# Patient Record
Sex: FEMALE | Race: WHITE | NOT HISPANIC OR LATINO | Employment: OTHER
[De-identification: names, ages, dates, MRNs, and addresses within clinical notes are randomized per-mention and may not be internally consistent; named-entity substitution may affect disease eponyms.]

---

## 2017-10-01 ENCOUNTER — HEALTH MAINTENANCE LETTER (OUTPATIENT)
Age: 62
End: 2017-10-01

## 2021-05-28 ENCOUNTER — RECORDS - HEALTHEAST (OUTPATIENT)
Dept: ADMINISTRATIVE | Facility: CLINIC | Age: 66
End: 2021-05-28

## 2024-03-06 ENCOUNTER — DOCUMENTATION ONLY (OUTPATIENT)
Dept: GERIATRICS | Facility: CLINIC | Age: 69
End: 2024-03-06
Payer: COMMERCIAL

## 2024-03-06 PROBLEM — J96.90 RESPIRATORY FAILURE (H): Status: ACTIVE | Noted: 2023-10-18

## 2024-03-06 PROBLEM — G47.9 SLEEP DISORDER: Status: ACTIVE | Noted: 2019-05-01

## 2024-03-06 PROBLEM — S21.101A STERNAL WOUND INFECTION: Status: ACTIVE | Noted: 2023-09-29

## 2024-03-06 PROBLEM — L08.9 STERNAL WOUND INFECTION: Status: ACTIVE | Noted: 2023-09-29

## 2024-03-06 PROBLEM — J18.9 PNEUMONIA: Status: ACTIVE | Noted: 2022-12-19

## 2024-03-06 PROBLEM — Z90.3 HISTORY OF SLEEVE GASTRECTOMY: Status: ACTIVE | Noted: 2022-05-19

## 2024-03-06 PROBLEM — M54.50 CHRONIC RIGHT-SIDED LOW BACK PAIN: Status: ACTIVE | Noted: 2020-09-18

## 2024-03-06 PROBLEM — S06.0X9A CONCUSSION WITH LOSS OF CONSCIOUSNESS: Status: ACTIVE | Noted: 2023-01-26

## 2024-03-06 PROBLEM — R10.9 FLANK PAIN: Status: ACTIVE | Noted: 2022-11-13

## 2024-03-06 PROBLEM — H25.10 NUCLEAR SENILE CATARACT: Status: ACTIVE | Noted: 2024-03-06

## 2024-03-06 PROBLEM — T82.6XXA PROSTHETIC VALVE ENDOCARDITIS (H): Status: ACTIVE | Noted: 2024-02-14

## 2024-03-06 PROBLEM — R65.20 SEVERE SEPSIS (H): Status: ACTIVE | Noted: 2024-02-04

## 2024-03-06 PROBLEM — F11.20 OPIOID TYPE DEPENDENCE, CONTINUOUS USE (H): Status: ACTIVE | Noted: 2021-10-15

## 2024-03-06 PROBLEM — J96.21 ACUTE ON CHRONIC RESPIRATORY FAILURE WITH HYPOXIA AND HYPERCAPNIA (H): Status: ACTIVE | Noted: 2023-09-13

## 2024-03-06 PROBLEM — I33.0 ACUTE BACTERIAL ENDOCARDITIS: Status: ACTIVE | Noted: 2024-02-18

## 2024-03-06 PROBLEM — R78.81 ENTEROCOCCAL BACTEREMIA: Status: ACTIVE | Noted: 2024-02-06

## 2024-03-06 PROBLEM — M85.89 OSTEOPENIA OF MULTIPLE SITES: Status: ACTIVE | Noted: 2023-12-15

## 2024-03-06 PROBLEM — J96.11 CHRONIC RESPIRATORY FAILURE WITH HYPOXIA (H): Status: ACTIVE | Noted: 2021-12-31

## 2024-03-06 PROBLEM — K59.09 CHRONIC CONSTIPATION: Status: ACTIVE | Noted: 2020-10-28

## 2024-03-06 PROBLEM — R76.8 POSITIVE ANA (ANTINUCLEAR ANTIBODY): Status: ACTIVE | Noted: 2022-04-05

## 2024-03-06 PROBLEM — J44.1 ACUTE EXACERBATION OF CHRONIC OBSTRUCTIVE PULMONARY DISEASE (COPD) (H): Status: ACTIVE | Noted: 2022-10-27

## 2024-03-06 PROBLEM — F07.81 POST CONCUSSION SYNDROME: Status: ACTIVE | Noted: 2019-04-03

## 2024-03-06 PROBLEM — I38 PROSTHETIC VALVE ENDOCARDITIS (H): Status: ACTIVE | Noted: 2024-02-14

## 2024-03-06 PROBLEM — F33.9 DEPRESSION, RECURRENT (H): Status: ACTIVE | Noted: 2022-02-01

## 2024-03-06 PROBLEM — B95.2 ENTEROCOCCAL BACTEREMIA: Status: ACTIVE | Noted: 2024-02-06

## 2024-03-06 PROBLEM — T81.328A STERNAL WOUND DEHISCENCE: Status: ACTIVE | Noted: 2023-09-29

## 2024-03-06 PROBLEM — R41.89 COGNITIVE IMPAIRMENT: Status: ACTIVE | Noted: 2019-05-01

## 2024-03-06 PROBLEM — I35.0 AORTIC STENOSIS: Status: ACTIVE | Noted: 2022-12-19

## 2024-03-06 PROBLEM — M25.511 CHRONIC RIGHT SHOULDER PAIN: Status: ACTIVE | Noted: 2020-09-18

## 2024-03-06 PROBLEM — R31.9 HEMATURIA: Status: ACTIVE | Noted: 2022-11-13

## 2024-03-06 PROBLEM — J98.11 COMPLETE ATELECTASIS OF LEFT LUNG: Status: ACTIVE | Noted: 2023-11-14

## 2024-03-06 PROBLEM — G89.29 CHRONIC RIGHT-SIDED LOW BACK PAIN: Status: ACTIVE | Noted: 2020-09-18

## 2024-03-06 PROBLEM — R42 VERTIGO: Status: ACTIVE | Noted: 2023-12-01

## 2024-03-06 PROBLEM — E66.01 OBESITY, CLASS III, BMI 40-49.9 (MORBID OBESITY) (H): Status: ACTIVE | Noted: 2022-11-13

## 2024-03-06 PROBLEM — G89.29 CHRONIC RIGHT SHOULDER PAIN: Status: ACTIVE | Noted: 2020-09-18

## 2024-03-06 PROBLEM — J44.9 COPD, MODERATE (H): Status: ACTIVE | Noted: 2019-10-21

## 2024-03-06 PROBLEM — M54.6 CHRONIC RIGHT-SIDED THORACIC BACK PAIN: Status: ACTIVE | Noted: 2022-11-22

## 2024-03-06 PROBLEM — L85.3 XEROSIS CUTIS: Status: ACTIVE | Noted: 2020-10-28

## 2024-03-06 PROBLEM — Z95.2 HISTORY OF AORTIC VALVE REPLACEMENT: Status: ACTIVE | Noted: 2023-09-05

## 2024-03-06 PROBLEM — L73.2 HIDRADENITIS: Status: ACTIVE | Noted: 2021-11-08

## 2024-03-06 PROBLEM — J96.22 ACUTE ON CHRONIC RESPIRATORY FAILURE WITH HYPOXIA AND HYPERCAPNIA (H): Status: ACTIVE | Noted: 2023-09-13

## 2024-03-06 PROBLEM — U07.1 COVID-19 VIRUS INFECTION: Status: ACTIVE | Noted: 2022-02-01

## 2024-03-06 PROBLEM — I33.0 ABSCESS OF AORTIC ROOT: Status: ACTIVE | Noted: 2024-02-14

## 2024-03-06 PROBLEM — A41.9 SEVERE SEPSIS (H): Status: ACTIVE | Noted: 2024-02-04

## 2024-03-06 PROBLEM — G56.21 ULNAR NEUROPATHY AT ELBOW, RIGHT: Status: ACTIVE | Noted: 2022-06-13

## 2024-03-06 PROBLEM — S06.0X1A CONCUSSION WITH BRIEF LOC: Status: ACTIVE | Noted: 2019-04-03

## 2024-03-06 PROBLEM — N12 PYELONEPHRITIS: Status: ACTIVE | Noted: 2022-11-15

## 2024-03-06 PROBLEM — R11.0 NAUSEA: Status: ACTIVE | Noted: 2021-08-17

## 2024-03-06 PROBLEM — K21.9 LARYNGOPHARYNGEAL REFLUX (LPR): Status: ACTIVE | Noted: 2021-08-17

## 2024-03-06 PROBLEM — N83.8 OVARIAN MASS: Status: ACTIVE | Noted: 2022-11-14

## 2024-03-06 PROBLEM — S81.801D LEG WOUND, RIGHT, SUBSEQUENT ENCOUNTER: Status: ACTIVE | Noted: 2023-12-01

## 2024-03-06 PROBLEM — Z79.52 CURRENT CHRONIC USE OF SYSTEMIC STEROIDS: Status: ACTIVE | Noted: 2023-12-15

## 2024-03-06 PROBLEM — G89.29 CHRONIC RIGHT-SIDED THORACIC BACK PAIN: Status: ACTIVE | Noted: 2022-11-22

## 2024-03-08 ENCOUNTER — TELEPHONE (OUTPATIENT)
Dept: GERIATRICS | Facility: CLINIC | Age: 69
End: 2024-03-08
Payer: COMMERCIAL

## 2024-03-08 ENCOUNTER — DOCUMENTATION ONLY (OUTPATIENT)
Dept: GERIATRICS | Facility: CLINIC | Age: 69
End: 2024-03-08
Payer: COMMERCIAL

## 2024-03-08 DIAGNOSIS — G89.29 CHRONIC RIGHT SHOULDER PAIN: Primary | ICD-10-CM

## 2024-03-08 DIAGNOSIS — M25.511 CHRONIC RIGHT SHOULDER PAIN: Primary | ICD-10-CM

## 2024-03-08 RX ORDER — GUAIFENESIN 600 MG/1
1200 TABLET, EXTENDED RELEASE ORAL 2 TIMES DAILY
COMMUNITY
Start: 2024-03-11 | End: 2024-04-04

## 2024-03-08 RX ORDER — OXYCODONE HYDROCHLORIDE 5 MG/1
5-10 TABLET ORAL EVERY 6 HOURS PRN
Qty: 30 TABLET | Refills: 0 | Status: SHIPPED | OUTPATIENT
Start: 2024-03-08 | End: 2024-03-21

## 2024-03-08 RX ORDER — ALBUTEROL SULFATE 0.83 MG/ML
2.5 SOLUTION RESPIRATORY (INHALATION) 4 TIMES DAILY PRN
COMMUNITY
Start: 2024-03-11

## 2024-03-08 RX ORDER — OXYCODONE HYDROCHLORIDE 5 MG/1
5-10 TABLET ORAL EVERY 6 HOURS PRN
COMMUNITY
End: 2024-03-08

## 2024-03-08 RX ORDER — OXYCODONE HYDROCHLORIDE 5 MG/1
5-10 TABLET ORAL EVERY 6 HOURS PRN
Qty: 30 TABLET | Refills: 0 | Status: CANCELLED | OUTPATIENT
Start: 2024-03-08

## 2024-03-08 RX ORDER — IPRATROPIUM BROMIDE AND ALBUTEROL SULFATE 2.5; .5 MG/3ML; MG/3ML
1 SOLUTION RESPIRATORY (INHALATION) 4 TIMES DAILY
COMMUNITY
Start: 2024-03-11

## 2024-03-08 NOTE — TELEPHONE ENCOUNTER
[facility] Triage Nurse Telephone Encounter    Provider: TAMMIE Rahman CNP  Facility: [facility]  Facility Type:  TCU    Caller: Chayo  Call Back Number:     Allergies:    Allergies   Allergen Reactions    Cephalexin Shortness Of Breath and Rash     And chest pain    Gabapentin Dizziness     Increased sedation even at 100 mg TID    Penicillin G Hives and Rash    Penicillins Hives and Rash        Reason for call: Patient admitted to facility yesterday.  Patient has some medication changes she wants done to reflect how she takes the medications at home.  Patient wants Marinol discontinue cause she is not having any issues with her appetite, Systane switched to PRN, Nebulizers switched so DuoNebs are scheduled and Albuterol is PRN, Mucinex scheduled BID, and Oxycodone switched from 10 mg to 5-10 mg PRN.      Verbal Order/Direction given by Provider: Discontinue Marinol, Change systane eye drop to PRN, schedule Duonebs QID and change Albuterol nebs to PRN, schedule Mucinex BID, and change Oxycodone to 5-10 mg every 6 hours PRN using pain scale.       Provider giving Order:  TAMMIE Rahman CNP    Verbal Order given to: Chayo Garcia RN

## 2024-03-10 ENCOUNTER — LAB REQUISITION (OUTPATIENT)
Dept: LAB | Facility: CLINIC | Age: 69
End: 2024-03-10

## 2024-03-10 DIAGNOSIS — R78.81 BACTEREMIA: ICD-10-CM

## 2024-03-10 NOTE — PROGRESS NOTES
[facility] GERIATRICS  INITIAL VISIT NOTE      PRIMARY CARE PROVIDER AND CLINIC: Bert Mccoy [address] [facility] [address]    [facility] Medical Record Number: [id number]  Place of Service where encounter took place: [facility] [place] ([facility]) [[id number]]    Chief Complaint   Patient presents with    Hospital F/U     [facility] 2/14/2024 - 3/7/2024     HPI:    Elly Corrales is a 68 year old (1955) female was admitted to the above facility from [facility] . Hospital stay 2/14/24 through 3/7/24 where they were admitted for endocarditis. Now admitted to this facility for rehab, medical management, and nursing care.      History obtained from: facility chart records, facility staff, patient report, and [facility] chart review.      Brief Hospital Course: PMH of severe aortic stenosis S/P aortic valve replacement, mild coronary artery disease, HTN, chronic pain with opioid dependence, TRE, obesity S/P gastric sleeve, and hypothyroidism with recent hospitalization for PEG tube site infection; developed cellulitis and sepsis. Blood cultures grew enterococcus. 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 Was transferred to [facility], [place]. ID continued vancomycin, started ceftriaxone and flagyl as had diverticulosis noted on CT. Underwent aortic root replacement Bentall procedure and homograph on 2/22. Did have significant bleeding, hypoxia requiring high vent settings and acute renal failure requiring Lasix gtt. Renal function improved, weaned off vent but continue to require supplemental oxygen. Had CT that showed LLL atelectasis, progressed septic emboli in right lung. Had episode of NSVT on 3/7, Echo showed pericardial effusion. Was started on colchicine x 3 months. ID recommended IV ceftriaxone and vancomycin x 28 days. Was started on dronabinol for poor appetite. When medically stable was  discharged to TCU for further rehab and medical management.      TCU Course: Seen today shortly after lunch. She had just been outside smoke a cigarette, says she only smokes about 2 a day, plans to not smoke at all when she goes home, requests that her children not be told that she has been smoking. She denies any pain currently. She had both PT and OT  this morning. Feels that they went well. Was able to walk ~10ft, says she is not able to walk very far at home because she gets SOB. Denies any SOB at rest, rare cough. No chest pain. Has been following her sternal precautions. Is having bowel movements. Has to eat small meals due to her gastric sleeve. She is hoping she will be able to go home in 10 days because she does not want her waiver to close, wants to due her antibiotics at home.     CODE STATUS/ADVANCE DIRECTIVES: CPR/Full code     ALLERGIES:  Allergies   Allergen Reactions    Cephalexin Shortness Of Breath and Rash     And chest pain    Gabapentin Dizziness     Increased sedation even at 100 mg TID    Penicillin G Hives and Rash    Penicillins Hives and Rash     PAST MEDICAL HISTORY:   No past medical history on file.  PAST SURGICAL HISTORY:   Past Surgical History:   Procedure Laterality Date    PARTIAL HYSTERECTOMY       FAMILY HISTORY:   No family history on file.      SOCIAL HISTORY:   Patient's living condition: lives with spouse    MEDICATIONS  Post Discharge Medication Reconciliation Status: discharge medications reconciled and changed, per note/orders.  Current Outpatient Medications   Medication Sig Dispense Refill    acetaminophen (TYLENOL) 500 MG tablet Take 1,000 mg by mouth 4 times daily      albuterol (PROAIR HFA/PROVENTIL HFA/VENTOLIN HFA) 108 (90 Base) MCG/ACT inhaler Inhale 2 puffs into the lungs every 4 hours as needed for shortness of breath, wheezing or cough      albuterol (PROVENTIL) (2.5 MG/3ML) 0.083% neb solution Take 2.5 mg by nebulization 4 times daily as needed for shortness of  breath, wheezing or cough      alteplase (CATHFLO ACTIVASE) 2 MG injection Inject 2 mg into the vein as needed      aspirin (ASA) 81 MG chewable tablet Take 81 mg by mouth daily      atorvastatin (LIPITOR) 40 MG tablet Take 40 mg by mouth daily      Calcium Carbonate Antacid 1177 MG CHEW Take 1 tablet by mouth 3 times daily      carvedilol (COREG) 25 MG tablet Take 25 mg by mouth 2 times daily (with meals)      cefTRIAXone (ROCEPHIN) 2 GM vial Inject 2 g into the vein every 12 hours For 28 days then discontinue      colchicine (COLCRYS) 0.6 MG tablet Take 0.6 mg by mouth 2 times daily      cyanocobalamin (VITAMIN B-12) 1000 MCG sublingual tablet Place 1 tablet under the tongue daily      DULoxetine (CYMBALTA) 60 MG capsule Take 60 mg by mouth daily      fluticasone (FLONASE) 50 MCG/ACT nasal spray Spray 1 spray into both nostrils daily as needed for rhinitis or allergies      Fluticasone-Umeclidin-Vilanterol (TRELEGY ELLIPTA) 200-62.5-25 MCG/ACT oral inhaler Inhale 1 puff into the lungs daily      furosemide (LASIX) 40 MG tablet Take 40 mg by mouth daily      guaiFENesin (MUCINEX) 600 MG 12 hr tablet Take 1,200 mg by mouth 2 times daily      hydrOXYzine deanna (VISTARIL) 25 MG capsule Take 25 mg by mouth 3 times daily as needed for itching      ipratropium - albuterol 0.5 mg/2.5 mg/3 mL (DUONEB) 0.5-2.5 (3) MG/3ML neb solution Take 1 vial by nebulization 4 times daily      levothyroxine (SYNTHROID/LEVOTHROID) 150 MCG tablet Take 150 mcg by mouth daily      lidocaine (LIDODERM) 5 % patch Place onto the skin every 24 hours To prevent lidocaine toxicity, patient should be patch free for 12 hrs daily.      loratadine (CLARITIN) 10 MG tablet Take 10 mg by mouth daily      losartan (COZAAR) 50 MG tablet Take 50 mg by mouth daily      magnesium oxide 400 MG tablet Take 400 mg by mouth daily      meclizine (ANTIVERT) 12.5 MG tablet Take 12.5 mg by mouth 3 times daily as needed for dizziness      melatonin 3 MG tablet Take 3 mg  "by mouth nightly as needed for sleep      multivitamin childrens (ANIMAL SHAPES) CHEW chewable tablet Take 2 tablets by mouth daily      ondansetron (ZOFRAN ODT) 8 MG ODT tab Take 8 mg by mouth every 8 hours as needed for nausea      oxyCODONE (ROXICODONE) 5 MG tablet Take 1-2 tablets (5-10 mg) by mouth every 6 hours as needed for severe pain 30 tablet 0    pantoprazole (PROTONIX) 40 MG EC tablet Take 40 mg by mouth 2 times daily      polyethylene glycol 3350 POWD Take 17 g by mouth daily as needed (constipation)      potassium chloride jaqui ER (KLOR-CON M20) 20 MEQ CR tablet Take 20 mEq by mouth daily      predniSONE (DELTASONE) 5 MG tablet Take 5 mg by mouth daily      propylene glycol (SYSTANE BALANCE) 0.6 % SOLN ophthalmic solution Place 1 drop into both eyes 4 times daily as needed for dry eyes      senna-docusate (SENOKOT-S/PERICOLACE) 8.6-50 MG tablet Take 2 tablets by mouth daily      simethicone (MYLICON) 125 MG chewable tablet Take 125 mg by mouth every 6 hours as needed for intestinal gas      Vancomycin HCl 1250 MG/250ML SOLN Inject 1,250 mg into the vein daily For 27 days then discontinue      Vitamin D3 (CHOLECALCIFEROL) 25 mcg (1000 units) tablet Take 1 tablet by mouth daily       ROS:  10 point ROS neg other than the symptoms noted above in the HPI.      PHYSICAL EXAM:  BP (!) 147/74   Pulse 87   Temp 98.1  F (36.7  C)   Resp 20   Ht 1.702 m (5' 7\")   Wt 93.9 kg (207 lb)   SpO2 96%   BMI 32.42 kg/m    Physical Exam  Constitutional:       Appearance: She is obese.   Cardiovascular:      Rate and Rhythm: Normal rate and regular rhythm.      Comments: + click  Pulmonary:      Effort: Pulmonary effort is normal.      Breath sounds: Normal breath sounds.      Comments: On 2L   Abdominal:      General: Bowel sounds are normal.      Palpations: Abdomen is soft.   Musculoskeletal:      Right lower leg: No edema.      Left lower leg: Edema present.   Neurological:      General: No focal deficit " present.      Mental Status: She is alert.   Psychiatric:         Mood and Affect: Mood normal.         Thought Content: Thought content normal.          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 Appears to be healing well. Denies any chest pain.  Discussed with patient, nursing staff, therapy.   - IV Vanco and Rocephin x 28 days, IV pharmacy to dose vanco  - CBC, Creatinine 2x/week while on IV abx, fax lab results to ID.   - sternal precautions  - analgesia with APAP 1000mg QID, oxycodoone 5-10mg PRN  - PT/OT  - follow-up with CV surgery    Pericardial effusion  Noted on Echo/CT on 3/4  - continue colchicine x 3 months    Coronary artery disease involving native coronary artery of native heart without angina pectoris  Primary hypertension  Hyperlipidemia, unspecified hyperlipidemia type  CAD mild per history. No recent chest pain. -160 since TCU admission,  Discussed with patient, nursing staff.   - daily weights  - continue Coreg, atorvastatin, losartan, ASA, furosemide  - follow-up with cardiology  - monitor and adjust     COPD, moderate (H)  Current chronic use of systemic steroids  Chronic respiratory failure   Physical deconditioning  Appears compensated. Discussed with patient - reports she uses 2-3L at baseline at home, 2 when at rest, 3L with activity. Per therapy SOB does limit activity tolerance.  Discussed with patient, nursing staff, therapy  - continue albuterol nebs/MDI PRN  - Trelegy inhaler daily  - oxygen 2-3L continuous    ABLA (acute blood loss anemia)  Hgb running 8-9,   - hgb to monitor for stability     Chronic right shoulder pain  - continue analgesia as above  - PT/OT    Gastroesophageal reflux disease without esophagitis  -  continue pantoprazole    gastrocutaneous fistula  PEG tube removed; Likely source of endocarditis. Continues to have drainage at site.  Discussed with patient, nursing staff.   - continue current wound cares   - follow-up with general surgery    History of sleeve gastrectomy  - eats enrique meal    Depression, recurrent (H24)  In good spirits on visit today    - continue Cymbalta,     Chronic constipation  Requesting senna be increased to BID  - senna-s 2 tab BID  - miralax daily PRN     Hypothyroidism, unspecified type  TSH 1.16  - continue levothyroxine 150mcg daily    Smoker  Currently smokers 2+ cigarettes a day, plans to stop when she goes home. Declined cum, nicotine patch  - supportive cares.       Orders:   Change meclizine to 12.5mg q AM and 12.5mg BID PRN  Oxygen 2-3L continuous  Ok to leave sternal incision SARAHI  Increase senna-s to 2 tab BID  Change albuterol MDI to 2 puff q4h PRN    Total time spent with patient visit at the skilled nursing facility was 45 min including patient visit and review of past records. Total time spent reviewing records from hospitalization outside my organization, review of TCU facility records, medication reconciliation, discussion of plan of care with patient, nursing staff and therapy as stated above as well as time spent on documentation.      Electronically signed by:  TAMMIE Cruz CNP

## 2024-03-11 ENCOUNTER — TRANSITIONAL CARE UNIT VISIT (OUTPATIENT)
Dept: GERIATRICS | Facility: CLINIC | Age: 69
End: 2024-03-11
Payer: COMMERCIAL

## 2024-03-11 VITALS
DIASTOLIC BLOOD PRESSURE: 74 MMHG | OXYGEN SATURATION: 96 % | BODY MASS INDEX: 32.49 KG/M2 | WEIGHT: 207 LBS | RESPIRATION RATE: 20 BRPM | TEMPERATURE: 98.1 F | HEART RATE: 87 BPM | HEIGHT: 67 IN | SYSTOLIC BLOOD PRESSURE: 147 MMHG

## 2024-03-11 DIAGNOSIS — Z90.3 HISTORY OF SLEEVE GASTRECTOMY: ICD-10-CM

## 2024-03-11 DIAGNOSIS — G89.29 CHRONIC RIGHT SHOULDER PAIN: ICD-10-CM

## 2024-03-11 DIAGNOSIS — K31.6 GASTROCUTANEOUS FISTULA DUE TO GASTROSTOMY TUBE: ICD-10-CM

## 2024-03-11 DIAGNOSIS — I31.39 PERICARDIAL EFFUSION: ICD-10-CM

## 2024-03-11 DIAGNOSIS — I10 PRIMARY HYPERTENSION: ICD-10-CM

## 2024-03-11 DIAGNOSIS — Z79.52 CURRENT CHRONIC USE OF SYSTEMIC STEROIDS: ICD-10-CM

## 2024-03-11 DIAGNOSIS — I33.0 ACUTE BACTERIAL ENDOCARDITIS: Primary | ICD-10-CM

## 2024-03-11 DIAGNOSIS — E78.5 HYPERLIPIDEMIA, UNSPECIFIED HYPERLIPIDEMIA TYPE: ICD-10-CM

## 2024-03-11 DIAGNOSIS — I38 PROSTHETIC VALVE ENDOCARDITIS, SUBSEQUENT ENCOUNTER: ICD-10-CM

## 2024-03-11 DIAGNOSIS — D62 ABLA (ACUTE BLOOD LOSS ANEMIA): ICD-10-CM

## 2024-03-11 DIAGNOSIS — F17.200 SMOKER: ICD-10-CM

## 2024-03-11 DIAGNOSIS — Z48.812 AFTERCARE FOLLOWING SURGERY OF THE CIRCULATORY SYSTEM, NEC: ICD-10-CM

## 2024-03-11 DIAGNOSIS — K59.09 CHRONIC CONSTIPATION: ICD-10-CM

## 2024-03-11 DIAGNOSIS — I33.0 ABSCESS OF AORTIC ROOT: ICD-10-CM

## 2024-03-11 DIAGNOSIS — E03.9 HYPOTHYROIDISM, UNSPECIFIED TYPE: ICD-10-CM

## 2024-03-11 DIAGNOSIS — I25.10 CORONARY ARTERY DISEASE INVOLVING NATIVE CORONARY ARTERY OF NATIVE HEART WITHOUT ANGINA PECTORIS: ICD-10-CM

## 2024-03-11 DIAGNOSIS — T82.6XXD PROSTHETIC VALVE ENDOCARDITIS, SUBSEQUENT ENCOUNTER: ICD-10-CM

## 2024-03-11 DIAGNOSIS — R53.81 PHYSICAL DECONDITIONING: ICD-10-CM

## 2024-03-11 DIAGNOSIS — K21.9 GASTROESOPHAGEAL REFLUX DISEASE WITHOUT ESOPHAGITIS: ICD-10-CM

## 2024-03-11 DIAGNOSIS — F33.9 DEPRESSION, RECURRENT (H): ICD-10-CM

## 2024-03-11 DIAGNOSIS — J44.9 COPD, MODERATE (H): ICD-10-CM

## 2024-03-11 DIAGNOSIS — M25.511 CHRONIC RIGHT SHOULDER PAIN: ICD-10-CM

## 2024-03-11 LAB
BASOPHILS # BLD MANUAL: 0.3 10E3/UL (ref 0–0.2)
BASOPHILS NFR BLD MANUAL: 3 %
CREAT SERPL-MCNC: 0.83 MG/DL (ref 0.51–0.95)
EGFRCR SERPLBLD CKD-EPI 2021: 77 ML/MIN/1.73M2
EOSINOPHIL # BLD MANUAL: 0.3 10E3/UL (ref 0–0.7)
EOSINOPHIL NFR BLD MANUAL: 3 %
LYMPHOCYTES # BLD MANUAL: 0.8 10E3/UL (ref 0.8–5.3)
LYMPHOCYTES NFR BLD MANUAL: 9 %
MONOCYTES # BLD MANUAL: 1.1 10E3/UL (ref 0–1.3)
MONOCYTES NFR BLD MANUAL: 12 %
NEUTROPHILS # BLD MANUAL: 6.7 10E3/UL (ref 1.6–8.3)
NEUTROPHILS NFR BLD MANUAL: 73 %
NRBC # BLD AUTO: 0 10E3/UL
NRBC BLD AUTO-RTO: 0 /100
PLAT MORPH BLD: ABNORMAL
RBC MORPH BLD: ABNORMAL
VANCOMYCIN SERPL-MCNC: 16.9 UG/ML
WBC # BLD AUTO: 9.2 10E3/UL (ref 4–11)

## 2024-03-11 PROCEDURE — 80202 ASSAY OF VANCOMYCIN: CPT | Performed by: NURSE PRACTITIONER

## 2024-03-11 PROCEDURE — P9603 ONE-WAY ALLOW PRORATED MILES: HCPCS | Performed by: NURSE PRACTITIONER

## 2024-03-11 PROCEDURE — 36415 COLL VENOUS BLD VENIPUNCTURE: CPT | Performed by: NURSE PRACTITIONER

## 2024-03-11 PROCEDURE — 85007 BL SMEAR W/DIFF WBC COUNT: CPT | Performed by: NURSE PRACTITIONER

## 2024-03-11 PROCEDURE — 82565 ASSAY OF CREATININE: CPT | Performed by: NURSE PRACTITIONER

## 2024-03-11 PROCEDURE — 85048 AUTOMATED LEUKOCYTE COUNT: CPT | Performed by: NURSE PRACTITIONER

## 2024-03-11 PROCEDURE — 99310 SBSQ NF CARE HIGH MDM 45: CPT | Performed by: NURSE PRACTITIONER

## 2024-03-11 RX ORDER — COLCHICINE 0.6 MG/1
0.6 TABLET ORAL 2 TIMES DAILY
COMMUNITY
Start: 2024-03-11 | End: 2024-04-04

## 2024-03-11 RX ORDER — LANOLIN ALCOHOL/MO/W.PET/CERES
3 CREAM (GRAM) TOPICAL
COMMUNITY
Start: 2024-03-11 | End: 2024-04-04

## 2024-03-11 RX ORDER — ONDANSETRON 8 MG/1
8 TABLET, ORALLY DISINTEGRATING ORAL EVERY 8 HOURS PRN
COMMUNITY
Start: 2024-03-11 | End: 2024-04-04

## 2024-03-11 RX ORDER — LOSARTAN POTASSIUM 50 MG/1
50 TABLET ORAL DAILY
COMMUNITY
Start: 2024-03-11

## 2024-03-11 RX ORDER — LEVOTHYROXINE SODIUM 150 UG/1
150 TABLET ORAL DAILY
COMMUNITY
Start: 2024-03-11

## 2024-03-11 RX ORDER — SIMETHICONE 125 MG
125 TABLET,CHEWABLE ORAL EVERY 6 HOURS PRN
COMMUNITY
Start: 2024-03-11

## 2024-03-11 RX ORDER — PANTOPRAZOLE SODIUM 40 MG/1
40 TABLET, DELAYED RELEASE ORAL 2 TIMES DAILY
COMMUNITY
Start: 2024-03-11 | End: 2024-04-04

## 2024-03-11 RX ORDER — ALBUTEROL SULFATE 90 UG/1
2 AEROSOL, METERED RESPIRATORY (INHALATION) EVERY 4 HOURS PRN
COMMUNITY
Start: 2024-03-11

## 2024-03-11 RX ORDER — FLUTICASONE PROPIONATE 50 MCG
1 SPRAY, SUSPENSION (ML) NASAL DAILY PRN
COMMUNITY
Start: 2024-03-11

## 2024-03-11 RX ORDER — HYDROXYZINE PAMOATE 25 MG/1
25 CAPSULE ORAL 3 TIMES DAILY PRN
COMMUNITY
Start: 2024-03-11 | End: 2024-04-04

## 2024-03-11 RX ORDER — PREDNISONE 5 MG/1
5 TABLET ORAL DAILY
COMMUNITY
Start: 2024-03-11 | End: 2024-04-04

## 2024-03-11 RX ORDER — POLYETHYLENE GLYCOL 3350
17 POWDER (GRAM) MISCELLANEOUS DAILY PRN
COMMUNITY
Start: 2024-03-11

## 2024-03-11 RX ORDER — CEFTRIAXONE 2 G/1
2 INJECTION, POWDER, FOR SOLUTION INTRAMUSCULAR; INTRAVENOUS EVERY 12 HOURS
COMMUNITY
Start: 2024-03-08 | End: 2024-04-04

## 2024-03-11 RX ORDER — VANCOMYCIN 1.75 G/350ML
1250 INJECTION, SOLUTION INTRAVENOUS DAILY
COMMUNITY
Start: 2024-03-08 | End: 2024-04-04

## 2024-03-11 RX ORDER — ASPIRIN 81 MG/1
81 TABLET, CHEWABLE ORAL DAILY
COMMUNITY
Start: 2024-03-11 | End: 2024-04-04

## 2024-03-11 RX ORDER — MAGNESIUM OXIDE 400 MG/1
400 TABLET ORAL DAILY
COMMUNITY
Start: 2024-03-11

## 2024-03-11 RX ORDER — CARVEDILOL 25 MG/1
25 TABLET ORAL 2 TIMES DAILY WITH MEALS
COMMUNITY
Start: 2024-03-11 | End: 2024-04-04

## 2024-03-11 RX ORDER — LIDOCAINE 50 MG/G
PATCH TOPICAL EVERY 24 HOURS
COMMUNITY
Start: 2024-03-11 | End: 2024-04-04 | Stop reason: ALTCHOICE

## 2024-03-11 RX ORDER — ACETAMINOPHEN 500 MG
1000 TABLET ORAL 4 TIMES DAILY
COMMUNITY
Start: 2024-03-11 | End: 2024-04-04

## 2024-03-11 RX ORDER — MECLIZINE HCL 12.5 MG 12.5 MG/1
12.5 TABLET ORAL EVERY MORNING
COMMUNITY
Start: 2024-03-11 | End: 2024-04-04

## 2024-03-11 RX ORDER — FUROSEMIDE 40 MG
40 TABLET ORAL DAILY
COMMUNITY
Start: 2024-03-11 | End: 2024-04-04

## 2024-03-11 RX ORDER — VITAMIN B COMPLEX
1 TABLET ORAL DAILY
COMMUNITY
Start: 2024-03-11

## 2024-03-11 RX ORDER — PEDIATRIC MULTIVITAMIN NO.17
2 TABLET,CHEWABLE ORAL DAILY
COMMUNITY
Start: 2024-03-11

## 2024-03-11 RX ORDER — LORATADINE 10 MG/1
10 TABLET ORAL DAILY
COMMUNITY
Start: 2024-03-11 | End: 2024-04-04

## 2024-03-11 RX ORDER — ATORVASTATIN CALCIUM 40 MG/1
40 TABLET, FILM COATED ORAL DAILY
COMMUNITY
Start: 2024-03-11

## 2024-03-11 RX ORDER — AMOXICILLIN 250 MG
2 CAPSULE ORAL 2 TIMES DAILY
COMMUNITY
Start: 2024-03-11 | End: 2024-04-04

## 2024-03-11 RX ORDER — POTASSIUM CHLORIDE 1500 MG/1
20 TABLET, EXTENDED RELEASE ORAL DAILY
COMMUNITY
Start: 2024-03-11

## 2024-03-11 RX ORDER — DULOXETIN HYDROCHLORIDE 60 MG/1
60 CAPSULE, DELAYED RELEASE ORAL DAILY
COMMUNITY
Start: 2024-03-11 | End: 2024-04-04

## 2024-03-11 NOTE — LETTER
3/11/2024        RE: Elly Corrales  [address]  [place] [facility] [address]        [facility] GERIATRICS  INITIAL VISIT NOTE  March 10, 2024    PRIMARY CARE PROVIDER AND CLINIC: Bert Mccoy [address] [facility] [id number]    [facility] Medical Record Number: [id number]  Place of Service where encounter took place: [facility] [place] ([facility]) [[id number]]    Chief Complaint   Patient presents with     Hospital F/U     [facility] 2/14/2024 - 3/7/2024     HPI:    Elly Corrales is a 68 year old (1955) female was admitted to the above facility from [facility] . Hospital stay 2/14/24 through 3/7/24 where they were admitted for endocarditis. Now admitted to this facility for rehab, medical management, and nursing care.      History obtained from: facility chart records, facility staff, patient report, and [facility] chart review.      Brief Hospital Course: PMH of severe aortic stenosis S/P aortic valve replacement, mild coronary artery disease, HTN, chronic pain with opioid dependence, TRE, obesity S/P gastric sleeve, and hypothyroidism with recent hospitalization for PEG tube site infection; developed cellulitis and sepsis. Blood cultures grew enterococcus. 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 Was transferred to [facility]. ID continued vancomycin, started ceftriaxone and flagyl as had diverticulosis noted on CT. Underwent aortic root replacement Bentall procedure and homograph on 2/22. Did have significant bleeding, hypoxia requiring high vent settings and acute renal failure requiring Lasix gtt. Renal function improved, weaned off vent but continue to require supplemental oxygen. Had CT that showed LLL atelectasis, progressed septic emboli in right lung. Had episode of NSVT on 3/7, Echo showed pericardial effusion. Was started on colchicine x 3 months. ID recommended IV  ceftriaxone and vancomycin x 28 days. Was started on dronabinol for poor appetite. When medically stable was discharged to TCU for further rehab and medical management.      TCU Course: Seen today shortly after lunch. She had just been outside smoke a cigarette, says she only smokes about 2 a day, plans to not smoke at all when she goes home, requests that her children not be told that she has been smoking. She denies any pain currently. She had both PT and OT  this morning. Feels that they went well. Was able to walk ~10ft, says she is not able to walk very far at home because she gets SOB. Denies any SOB at rest, rare cough. No chest pain. Has been following her sternal precautions. Is having bowel movements. Has to eat small meals due to her gastric sleeve. She is hoping she will be able to go home in 10 days because she does not want her waiver to close, wants to due her antibiotics at home.     CODE STATUS/ADVANCE DIRECTIVES: CPR/Full code     ALLERGIES:  Allergies   Allergen Reactions     Cephalexin Shortness Of Breath and Rash     And chest pain     Gabapentin Dizziness     Increased sedation even at 100 mg TID     Penicillin G Hives and Rash     Penicillins Hives and Rash     PAST MEDICAL HISTORY:   No past medical history on file.  PAST SURGICAL HISTORY:   Past Surgical History:   Procedure Laterality Date     PARTIAL HYSTERECTOMY       FAMILY HISTORY:   No family history on file.      SOCIAL HISTORY:   Patient's living condition: lives with spouse    MEDICATIONS  Post Discharge Medication Reconciliation Status: discharge medications reconciled and changed, per note/orders.  Current Outpatient Medications   Medication Sig Dispense Refill     acetaminophen (TYLENOL) 500 MG tablet Take 1,000 mg by mouth 4 times daily       albuterol (PROAIR HFA/PROVENTIL HFA/VENTOLIN HFA) 108 (90 Base) MCG/ACT inhaler Inhale 2 puffs into the lungs every 4 hours as needed for shortness of breath, wheezing or cough        albuterol (PROVENTIL) (2.5 MG/3ML) 0.083% neb solution Take 2.5 mg by nebulization 4 times daily as needed for shortness of breath, wheezing or cough       alteplase (CATHFLO ACTIVASE) 2 MG injection Inject 2 mg into the vein as needed       aspirin (ASA) 81 MG chewable tablet Take 81 mg by mouth daily       atorvastatin (LIPITOR) 40 MG tablet Take 40 mg by mouth daily       Calcium Carbonate Antacid 1177 MG CHEW Take 1 tablet by mouth 3 times daily       carvedilol (COREG) 25 MG tablet Take 25 mg by mouth 2 times daily (with meals)       cefTRIAXone (ROCEPHIN) 2 GM vial Inject 2 g into the vein every 12 hours For 28 days then discontinue       colchicine (COLCRYS) 0.6 MG tablet Take 0.6 mg by mouth 2 times daily       cyanocobalamin (VITAMIN B-12) 1000 MCG sublingual tablet Place 1 tablet under the tongue daily       DULoxetine (CYMBALTA) 60 MG capsule Take 60 mg by mouth daily       fluticasone (FLONASE) 50 MCG/ACT nasal spray Spray 1 spray into both nostrils daily as needed for rhinitis or allergies       Fluticasone-Umeclidin-Vilanterol (TRELEGY ELLIPTA) 200-62.5-25 MCG/ACT oral inhaler Inhale 1 puff into the lungs daily       furosemide (LASIX) 40 MG tablet Take 40 mg by mouth daily       guaiFENesin (MUCINEX) 600 MG 12 hr tablet Take 1,200 mg by mouth 2 times daily       hydrOXYzine deanna (VISTARIL) 25 MG capsule Take 25 mg by mouth 3 times daily as needed for itching       ipratropium - albuterol 0.5 mg/2.5 mg/3 mL (DUONEB) 0.5-2.5 (3) MG/3ML neb solution Take 1 vial by nebulization 4 times daily       levothyroxine (SYNTHROID/LEVOTHROID) 150 MCG tablet Take 150 mcg by mouth daily       lidocaine (LIDODERM) 5 % patch Place onto the skin every 24 hours To prevent lidocaine toxicity, patient should be patch free for 12 hrs daily.       loratadine (CLARITIN) 10 MG tablet Take 10 mg by mouth daily       losartan (COZAAR) 50 MG tablet Take 50 mg by mouth daily       magnesium oxide 400 MG tablet Take 400 mg by mouth  "daily       meclizine (ANTIVERT) 12.5 MG tablet Take 12.5 mg by mouth 3 times daily as needed for dizziness       melatonin 3 MG tablet Take 3 mg by mouth nightly as needed for sleep       multivitamin childrens (ANIMAL SHAPES) CHEW chewable tablet Take 2 tablets by mouth daily       ondansetron (ZOFRAN ODT) 8 MG ODT tab Take 8 mg by mouth every 8 hours as needed for nausea       oxyCODONE (ROXICODONE) 5 MG tablet Take 1-2 tablets (5-10 mg) by mouth every 6 hours as needed for severe pain 30 tablet 0     pantoprazole (PROTONIX) 40 MG EC tablet Take 40 mg by mouth 2 times daily       polyethylene glycol 3350 POWD Take 17 g by mouth daily as needed (constipation)       potassium chloride jaqui ER (KLOR-CON M20) 20 MEQ CR tablet Take 20 mEq by mouth daily       predniSONE (DELTASONE) 5 MG tablet Take 5 mg by mouth daily       propylene glycol (SYSTANE BALANCE) 0.6 % SOLN ophthalmic solution Place 1 drop into both eyes 4 times daily as needed for dry eyes       senna-docusate (SENOKOT-S/PERICOLACE) 8.6-50 MG tablet Take 2 tablets by mouth daily       simethicone (MYLICON) 125 MG chewable tablet Take 125 mg by mouth every 6 hours as needed for intestinal gas       Vancomycin HCl 1250 MG/250ML SOLN Inject 1,250 mg into the vein daily For 27 days then discontinue       Vitamin D3 (CHOLECALCIFEROL) 25 mcg (1000 units) tablet Take 1 tablet by mouth daily       ROS:  10 point ROS neg other than the symptoms noted above in the HPI.      PHYSICAL EXAM:  BP (!) 147/74   Pulse 87   Temp 98.1  F (36.7  C)   Resp 20   Ht 1.702 m (5' 7\")   Wt 93.9 kg (207 lb)   SpO2 96%   BMI 32.42 kg/m    Physical Exam  Constitutional:       Appearance: She is obese.   Cardiovascular:      Rate and Rhythm: Normal rate and regular rhythm.      Comments: + click  Pulmonary:      Effort: Pulmonary effort is normal.      Breath sounds: Normal breath sounds.      Comments: On 2L   Abdominal:      General: Bowel sounds are normal.      Palpations: " Abdomen is soft.   Musculoskeletal:      Right lower leg: No edema.      Left lower leg: Edema present.   Neurological:      General: No focal deficit present.      Mental Status: She is alert.   Psychiatric:         Mood and Affect: Mood normal.         Thought Content: Thought content normal.          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 Appears to be healing well. Denies any chest pain.  Discussed with patient, nursing staff, therapy.   - IV Vanco and Rocephin x 28 days, IV pharmacy to dose vanco  - CBC, Creatinine 2x/week while on IV abx, fax lab results to ID.   - sternal precautions  - analgesia with APAP 1000mg QID, oxycodoone 5-10mg PRN  - PT/OT  - follow-up with CV surgery    Pericardial effusion  Noted on Echo/CT on 3/4  - continue colchicine x 3 months    Coronary artery disease involving native coronary artery of native heart without angina pectoris  Primary hypertension  Hyperlipidemia, unspecified hyperlipidemia type  CAD mild per history. No recent chest pain. -160 since TCU admission,  Discussed with patient, nursing staff.   - daily weights  - continue Coreg, atorvastatin, losartan, ASA, furosemide  - follow-up with cardiology  - monitor and adjust     COPD, moderate (H)  Current chronic use of systemic steroids  Chronic respiratory failure   Physical deconditioning  Appears compensated. Discussed with patient - reports she uses 2-3L at baseline at home, 2 when at rest, 3L with activity. Per therapy SOB does limit activity tolerance.  Discussed with patient, nursing staff, therapy  - continue albuterol nebs/MDI PRN  - Trelegy inhaler daily  - oxygen 2-3L continuous    ABLA (acute blood loss anemia)  Hgb running 8-9,   - hgb to  monitor for stability     Chronic right shoulder pain  - continue analgesia as above  - PT/OT    Gastroesophageal reflux disease without esophagitis  - continue pantoprazole    gastrocutaneous fistula  PEG tube removed; Likely source of endocarditis. Continues to have drainage at site.  Discussed with patient, nursing staff.   - continue current wound cares   - follow-up with general surgery    History of sleeve gastrectomy  - eats enrique meal    Depression, recurrent (H24)  In good spirits on visit today    - continue Cymbalta,     Chronic constipation  Requesting senna be increased to BID  - senna-s 2 tab BID  - miralax daily PRN     Hypothyroidism, unspecified type  TSH 1.16  - continue levothyroxine 150mcg daily    Smoker  Currently smokers 2+ cigarettes a day, plans to stop when she goes home. Declined cum, nicotine patch  - supportive cares.       Orders:   Change meclizine to 12.5mg q AM and 12.5mg BID PRN  Oxygen 2-3L continuous  Ok to leave sternal incision SARAHI  Increase senna-s to 2 tab BID  Change albuterol MDI to 2 puff q4h PRN    Total time spent with patient visit at the skilled nursing facility was 45 min including patient visit and review of past records. Total time spent reviewing records from hospitalization outside my organization, review of TCU facility records, medication reconciliation, discussion of plan of care with patient, nursing staff and therapy as stated above as well as time spent on documentation.      Electronically signed by:  TAMMIE Cruz CNP       Sincerely,        TAMMIE Cruz CNP

## 2024-03-12 ENCOUNTER — DOCUMENTATION ONLY (OUTPATIENT)
Dept: OTHER | Facility: CLINIC | Age: 69
End: 2024-03-12
Payer: COMMERCIAL

## 2024-03-12 NOTE — PROGRESS NOTES
"[facility] GERIATRICS    PRIMARY CARE PROVIDER AND CLINIC:  Bert Mccoy, [address] / [facility] [address]  Chief Complaint   Patient presents with    Hospital F/U      [place] Medical Record Number:  [id number]  Place of [facility] where encounter took place[facility] [[id number]]    Elly Corrales  is a 68 year old  (1955), admitted to the above facility from  [facility] . Hospital stay 2/14/24 through 3/7/24..   HPI:    As per GNP's note:\"Brief Hospital Course: PMH of severe aortic stenosis S/P aortic valve replacement, mild coronary artery disease, HTN, chronic pain with opioid dependence, TRE, obesity S/P gastric sleeve, and hypothyroidism with recent hospitalization for PEG tube site infection; developed cellulitis and sepsis. Blood cultures grew enterococcus. 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 Was transferred to [facility]. ID continued vancomycin, started ceftriaxone and flagyl as had diverticulosis noted on CT. Underwent aortic root replacement Bentall procedure and homograph on 2/22. Did have significant bleeding, hypoxia requiring high vent settings and acute renal failure requiring Lasix gtt. Renal function improved, weaned off vent but continue to require supplemental oxygen. Had CT that showed LLL atelectasis, progressed septic emboli in right lung. Had episode of NSVT on 3/7, Echo showed pericardial effusion. Was started on colchicine x 3 months. ID recommended IV ceftriaxone and vancomycin x 28 days. Was started on dronabinol for poor appetite. When medically stable was discharged to TCU for further rehab and medical management.\"      Today:  - Heart: denies chest pain.   - Lung: report breathing is good, on O2 prn now, was on it all the time. Needs it with activities.   -Chest wound\" healed .  -PEG tube site: draining bile still. Asked for how long it will go on?  - " Rehab: reports going well. Walked 24 feet with the therapist.       =================================================================  CODE STATUS/ADVANCE DIRECTIVES DISCUSSION:  Full Code    ALLERGIES:   Allergies   Allergen Reactions    Cephalexin Shortness Of Breath and Rash     And chest pain    Gabapentin Dizziness     Increased sedation even at 100 mg TID    Penicillin G Hives and Rash    Penicillins Hives and Rash      PAST MEDICAL HISTORY: No past medical history on file.   PAST SURGICAL HISTORY:   has a past surgical history that includes Partial Hysterectomy.  FAMILY HISTORY: family history is not on file.  SOCIAL HISTORY:   reports that she has quit smoking. She does not have any smokeless tobacco history on file. She reports that she does not drink alcohol.  Patient's living condition: lives with spouse    Post Discharge Medication Reconciliation Status:   MED REC REQUIRED  Post Medication Reconciliation Status: discharge medications reconciled and changed, per note/orders       Current Outpatient Medications   Medication Sig    acetaminophen (TYLENOL) 500 MG tablet Take 1,000 mg by mouth 4 times daily    albuterol (PROAIR HFA/PROVENTIL HFA/VENTOLIN HFA) 108 (90 Base) MCG/ACT inhaler Inhale 2 puffs into the lungs every 4 hours as needed for shortness of breath, wheezing or cough    albuterol (PROVENTIL) (2.5 MG/3ML) 0.083% neb solution Take 2.5 mg by nebulization 4 times daily as needed for shortness of breath, wheezing or cough    alteplase (CATHFLO ACTIVASE) 2 MG injection Inject 2 mg into the vein as needed    aspirin (ASA) 81 MG chewable tablet Take 81 mg by mouth daily    atorvastatin (LIPITOR) 40 MG tablet Take 40 mg by mouth daily    Calcium Carbonate Antacid 1177 MG CHEW Take 1 tablet by mouth 3 times daily    carvedilol (COREG) 25 MG tablet Take 25 mg by mouth 2 times daily (with meals)    cefTRIAXone (ROCEPHIN) 2 GM vial Inject 2 g into the vein every 12 hours For 28 days then discontinue     colchicine (COLCRYS) 0.6 MG tablet Take 0.6 mg by mouth 2 times daily    cyanocobalamin (VITAMIN B-12) 1000 MCG sublingual tablet Place 1 tablet under the tongue daily    DULoxetine (CYMBALTA) 60 MG capsule Take 60 mg by mouth daily    fluticasone (FLONASE) 50 MCG/ACT nasal spray Spray 1 spray into both nostrils daily as needed for rhinitis or allergies    Fluticasone-Umeclidin-Vilanterol (TRELEGY ELLIPTA) 200-62.5-25 MCG/ACT oral inhaler Inhale 1 puff into the lungs daily    furosemide (LASIX) 40 MG tablet Take 40 mg by mouth daily    guaiFENesin (MUCINEX) 600 MG 12 hr tablet Take 1,200 mg by mouth 2 times daily    hydrOXYzine deanna (VISTARIL) 25 MG capsule Take 25 mg by mouth 3 times daily as needed for itching    ipratropium - albuterol 0.5 mg/2.5 mg/3 mL (DUONEB) 0.5-2.5 (3) MG/3ML neb solution Take 1 vial by nebulization 4 times daily    levothyroxine (SYNTHROID/LEVOTHROID) 150 MCG tablet Take 150 mcg by mouth daily    lidocaine (LIDODERM) 5 % patch Place onto the skin every 24 hours To prevent lidocaine toxicity, patient should be patch free for 12 hrs daily.    loratadine (CLARITIN) 10 MG tablet Take 10 mg by mouth daily    losartan (COZAAR) 50 MG tablet Take 50 mg by mouth daily    magnesium oxide 400 MG tablet Take 400 mg by mouth daily    meclizine (ANTIVERT) 12.5 MG tablet Take 12.5 mg by mouth every morning And 12.5mg Po BID PRN    melatonin 3 MG tablet Take 3 mg by mouth nightly as needed for sleep    multivitamin childrens (ANIMAL SHAPES) CHEW chewable tablet Take 2 tablets by mouth daily    ondansetron (ZOFRAN ODT) 8 MG ODT tab Take 8 mg by mouth every 8 hours as needed for nausea    oxyCODONE (ROXICODONE) 5 MG tablet Take 1-2 tablets (5-10 mg) by mouth every 6 hours as needed for severe pain    pantoprazole (PROTONIX) 40 MG EC tablet Take 40 mg by mouth 2 times daily    polyethylene glycol 3350 POWD Take 17 g by mouth daily as needed (constipation)    potassium chloride jaqui ER (KLOR-CON M20) 20 MEQ CR  "tablet Take 20 mEq by mouth daily    predniSONE (DELTASONE) 5 MG tablet Take 5 mg by mouth daily    propylene glycol (SYSTANE BALANCE) 0.6 % SOLN ophthalmic solution Place 1 drop into both eyes 4 times daily as needed for dry eyes    senna-docusate (SENOKOT-S/PERICOLACE) 8.6-50 MG tablet Take 2 tablets by mouth 2 times daily    simethicone (MYLICON) 125 MG chewable tablet Take 125 mg by mouth every 6 hours as needed for intestinal gas    Vancomycin HCl 1250 MG/250ML SOLN Inject 1,250 mg into the vein daily For 27 days then discontinue    Vitamin D3 (CHOLECALCIFEROL) 25 mcg (1000 units) tablet Take 1 tablet by mouth daily     No current facility-administered medications for this visit.       ROS:  10 point ROS of systems including Constitutional, Eyes, Respiratory, Cardiovascular, Gastroenterology, Genitourinary, Integumentary, Musculoskeletal, Psychiatric were all negative except for pertinent positives noted in my HPI.    Vitals:  /81   Pulse 80   Temp 97.8  F (36.6  C)   Resp 16   Ht 1.702 m (5' 7\")   Wt 97.3 kg (214 lb 9.6 oz)   SpO2 95%   BMI 33.61 kg/m    Exam:  GENERAL APPEARANCE:  in no distress,   RESP:  Unlabored breathing. CTA b/l but coarse over the bases. No wheezing or crackles appreciated.   CV:  S1S2 audible, regular HR, no murmur appreciated.   ABDOMEN:  soft, NT/ND, BS audible.   M/S:   1+ edema over right leg. Non over left leg   SKIN:  healed sternotomy, small scab over the distal part. Healed tracheotomy site. Picc line in place. Shiny and tight skin over right leg.  Ostomy bag over PEG tube site, draining greenish fluid.   NEURO:   No NFD appreciated on observation.   PSYCH:  affect and mood normal     Lab/Diagnostic data: Reviewed in the chart and EHR.        ASSESSMENT/PLAN:  ----------------------------  Bacterial endocarditis, unspecified chronicity  Prosthetic valve endocarditis, subsequent encounter  Pericardial effusion  Coronary artery disease involving native coronary " artery of native heart without angina pectoris  Primary hypertension  Hyperlipidemia, unspecified hyperlipidemia type  Gastrocutaneous fistula due to gastrostomy tube  S/P aortic root replacement Bentall and homograft reimplantation of coronaries (2/22/24)  Gastric upset  -hx of AVR  in Sept 2023, complicated with  sternal wound dehiscence, mediastinitis, LLL collapse, critical illness myopathy, resulted in tracheostomy and PEG placement, dismissed to LTAC. PEG tube removed, later the site got infected resulted in spesis, and melissa the cause for the endocarditis.     -on vanc and rocephin, now, tolerating therapy. Clinically improving. ID following.    - Does not know if she has a follow up with CV. We reviewed hospital record, and shared with the patient that she need to follow up in 4-6 weeks with CV team in [facility]. Phone number provided to the patient.     -wound routine care.  Regarding when the fistula will stop draining, we do not know, the time will till. Pt unsure if has a follow up with general surgery, we reviewed the hospital record and it states that pt need to see general surgery in 4 week at [facility].     -analgesia optimal with the current regimen    -continue colchicine for pericardia effusion. Monitor for UGI irritation, worsening renal function, bleeding.     -reports poor appetite for she cannot take more than a few bites before her stomach start cramping. Does not like the taste of protein shake in general. Pt is on pantoprazole 40 mg bid already. On ASA and colchicine which could be contributing.       RLE DVT:  - had DVT and started on eliquis, repeat USG was negative, and eliquis was discontinued due to risk of bleed      COPD, moderate (H)  Chronic hypoxic and hypercapnia respiratory failure,   -  2-3 LPM O2 at home.   -history of respiratory failure required ventilatory support  -  appears compensated. Continue current regimen  - continues to go outside and smoke while  O2 is connected. We counseled on the risk of accident.     Blood loss anemia:  - clinically appears stable.       Chronic constipation  - now with watery stool, reports 6 episodes yesterday, seems stopped, and today had three. No other symptoms.   - will not prescribe  imodium as requested by the patient, will continue to monitor and see if the diarreha slows down after senna was stopped, if not, then will order c-diff lab.       Orders:  USG RLE rule out DVT.       ADDENDUM: USG negative for DVT      Electronically signed by:  Bozena Patel MD

## 2024-03-13 ENCOUNTER — LAB REQUISITION (OUTPATIENT)
Dept: LAB | Facility: CLINIC | Age: 69
End: 2024-03-13

## 2024-03-13 DIAGNOSIS — R78.81 BACTEREMIA: ICD-10-CM

## 2024-03-14 ENCOUNTER — TRANSITIONAL CARE UNIT VISIT (OUTPATIENT)
Dept: GERIATRICS | Facility: CLINIC | Age: 69
End: 2024-03-14
Payer: COMMERCIAL

## 2024-03-14 ENCOUNTER — TRANSFERRED RECORDS (OUTPATIENT)
Dept: HEALTH INFORMATION MANAGEMENT | Facility: CLINIC | Age: 69
End: 2024-03-14

## 2024-03-14 VITALS
HEART RATE: 80 BPM | BODY MASS INDEX: 33.68 KG/M2 | OXYGEN SATURATION: 95 % | RESPIRATION RATE: 16 BRPM | DIASTOLIC BLOOD PRESSURE: 81 MMHG | SYSTOLIC BLOOD PRESSURE: 137 MMHG | WEIGHT: 214.6 LBS | HEIGHT: 67 IN | TEMPERATURE: 97.8 F

## 2024-03-14 DIAGNOSIS — I33.0 BACTERIAL ENDOCARDITIS, UNSPECIFIED CHRONICITY: Primary | ICD-10-CM

## 2024-03-14 DIAGNOSIS — J44.9 COPD, MODERATE (H): ICD-10-CM

## 2024-03-14 DIAGNOSIS — I38 PROSTHETIC VALVE ENDOCARDITIS, SUBSEQUENT ENCOUNTER: ICD-10-CM

## 2024-03-14 DIAGNOSIS — K31.6 GASTROCUTANEOUS FISTULA DUE TO GASTROSTOMY TUBE: ICD-10-CM

## 2024-03-14 DIAGNOSIS — K59.09 CHRONIC CONSTIPATION: ICD-10-CM

## 2024-03-14 DIAGNOSIS — E78.5 HYPERLIPIDEMIA, UNSPECIFIED HYPERLIPIDEMIA TYPE: ICD-10-CM

## 2024-03-14 DIAGNOSIS — I10 PRIMARY HYPERTENSION: ICD-10-CM

## 2024-03-14 DIAGNOSIS — I31.39 PERICARDIAL EFFUSION: ICD-10-CM

## 2024-03-14 DIAGNOSIS — I25.10 CORONARY ARTERY DISEASE INVOLVING NATIVE CORONARY ARTERY OF NATIVE HEART WITHOUT ANGINA PECTORIS: ICD-10-CM

## 2024-03-14 DIAGNOSIS — J96.11 CHRONIC RESPIRATORY FAILURE WITH HYPOXIA AND HYPERCAPNIA (H): ICD-10-CM

## 2024-03-14 DIAGNOSIS — J96.12 CHRONIC RESPIRATORY FAILURE WITH HYPOXIA AND HYPERCAPNIA (H): ICD-10-CM

## 2024-03-14 DIAGNOSIS — T82.6XXD PROSTHETIC VALVE ENDOCARDITIS, SUBSEQUENT ENCOUNTER: ICD-10-CM

## 2024-03-14 DIAGNOSIS — D50.0 BLOOD LOSS ANEMIA: ICD-10-CM

## 2024-03-14 LAB
CREAT SERPL-MCNC: 0.96 MG/DL (ref 0.51–0.95)
EGFRCR SERPLBLD CKD-EPI 2021: 65 ML/MIN/1.73M2
VANCOMYCIN SERPL-MCNC: 14.9 UG/ML

## 2024-03-14 PROCEDURE — 82565 ASSAY OF CREATININE: CPT | Performed by: NURSE PRACTITIONER

## 2024-03-14 PROCEDURE — 80202 ASSAY OF VANCOMYCIN: CPT | Performed by: NURSE PRACTITIONER

## 2024-03-14 PROCEDURE — 99305 1ST NF CARE MODERATE MDM 35: CPT | Performed by: FAMILY MEDICINE

## 2024-03-14 PROCEDURE — 36415 COLL VENOUS BLD VENIPUNCTURE: CPT | Performed by: NURSE PRACTITIONER

## 2024-03-14 NOTE — LETTER
"    3/14/2024        RE: Elly Corrales  [address]  [facility] [address]        [facility] GERIATRICS    PRIMARY CARE PROVIDER AND CLINIC:  Bert Mccoy, [address] [facility] / [facility] [address]  Chief Complaint   Patient presents with     Hospital F/U      [place] Medical Record Number:  [id number]  [facility] of [facility] where encounter took [facility] [[id number]]    Elly Corrales  is a 68 year old  (1955), admitted to the above facility from  [facility] . Hospital stay 2/14/24 through 3/7/24..   HPI:    As per GNP's note:\"Brief Hospital Course: PMH of severe aortic stenosis S/P aortic valve replacement, mild coronary artery disease, HTN, chronic pain with opioid dependence, TRE, obesity S/P gastric sleeve, and hypothyroidism with recent hospitalization for PEG tube site infection; developed cellulitis and sepsis. Blood cultures grew enterococcus. 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 Was transferred to [facility]. ID continued vancomycin, started ceftriaxone and flagyl as had diverticulosis noted on CT. Underwent aortic root replacement Bentall procedure and homograph on 2/22. Did have significant bleeding, hypoxia requiring high vent settings and acute renal failure requiring Lasix gtt. Renal function improved, weaned off vent but continue to require supplemental oxygen. Had CT that showed LLL atelectasis, progressed septic emboli in right lung. Had episode of NSVT on 3/7, Echo showed pericardial effusion. Was started on colchicine x 3 months. ID recommended IV ceftriaxone and vancomycin x 28 days. Was started on dronabinol for poor appetite. When medically stable was discharged to TCU for further rehab and medical management.\"      Today:  - Heart: denies chest pain.   - Lung: report breathing is good, on O2 prn now, was on it all the time. Needs it with activities.   -Chest " "wound\" healed .  -PEG tube site: draining bile still. Asked for how long it will go on?  - Rehab: reports going well. Walked 24 feet with the therapist.       =================================================================  CODE STATUS/ADVANCE DIRECTIVES DISCUSSION:  Full Code    ALLERGIES:   Allergies   Allergen Reactions     Cephalexin Shortness Of Breath and Rash     And chest pain     Gabapentin Dizziness     Increased sedation even at 100 mg TID     Penicillin G Hives and Rash     Penicillins Hives and Rash      PAST MEDICAL HISTORY: No past medical history on file.   PAST SURGICAL HISTORY:   has a past surgical history that includes Partial Hysterectomy.  FAMILY HISTORY: family history is not on file.  SOCIAL HISTORY:   reports that she has quit smoking. She does not have any smokeless tobacco history on file. She reports that she does not drink alcohol.  Patient's living condition: lives with spouse    Post Discharge Medication Reconciliation Status:   MED REC REQUIRED  Post Medication Reconciliation Status: discharge medications reconciled and changed, per note/orders       Current Outpatient Medications   Medication Sig     acetaminophen (TYLENOL) 500 MG tablet Take 1,000 mg by mouth 4 times daily     albuterol (PROAIR HFA/PROVENTIL HFA/VENTOLIN HFA) 108 (90 Base) MCG/ACT inhaler Inhale 2 puffs into the lungs every 4 hours as needed for shortness of breath, wheezing or cough     albuterol (PROVENTIL) (2.5 MG/3ML) 0.083% neb solution Take 2.5 mg by nebulization 4 times daily as needed for shortness of breath, wheezing or cough     alteplase (CATHFLO ACTIVASE) 2 MG injection Inject 2 mg into the vein as needed     aspirin (ASA) 81 MG chewable tablet Take 81 mg by mouth daily     atorvastatin (LIPITOR) 40 MG tablet Take 40 mg by mouth daily     Calcium Carbonate Antacid 1177 MG CHEW Take 1 tablet by mouth 3 times daily     carvedilol (COREG) 25 MG tablet Take 25 mg by mouth 2 times daily (with meals)     " cefTRIAXone (ROCEPHIN) 2 GM vial Inject 2 g into the vein every 12 hours For 28 days then discontinue     colchicine (COLCRYS) 0.6 MG tablet Take 0.6 mg by mouth 2 times daily     cyanocobalamin (VITAMIN B-12) 1000 MCG sublingual tablet Place 1 tablet under the tongue daily     DULoxetine (CYMBALTA) 60 MG capsule Take 60 mg by mouth daily     fluticasone (FLONASE) 50 MCG/ACT nasal spray Spray 1 spray into both nostrils daily as needed for rhinitis or allergies     Fluticasone-Umeclidin-Vilanterol (TRELEGY ELLIPTA) 200-62.5-25 MCG/ACT oral inhaler Inhale 1 puff into the lungs daily     furosemide (LASIX) 40 MG tablet Take 40 mg by mouth daily     guaiFENesin (MUCINEX) 600 MG 12 hr tablet Take 1,200 mg by mouth 2 times daily     hydrOXYzine deanna (VISTARIL) 25 MG capsule Take 25 mg by mouth 3 times daily as needed for itching     ipratropium - albuterol 0.5 mg/2.5 mg/3 mL (DUONEB) 0.5-2.5 (3) MG/3ML neb solution Take 1 vial by nebulization 4 times daily     levothyroxine (SYNTHROID/LEVOTHROID) 150 MCG tablet Take 150 mcg by mouth daily     lidocaine (LIDODERM) 5 % patch Place onto the skin every 24 hours To prevent lidocaine toxicity, patient should be patch free for 12 hrs daily.     loratadine (CLARITIN) 10 MG tablet Take 10 mg by mouth daily     losartan (COZAAR) 50 MG tablet Take 50 mg by mouth daily     magnesium oxide 400 MG tablet Take 400 mg by mouth daily     meclizine (ANTIVERT) 12.5 MG tablet Take 12.5 mg by mouth every morning And 12.5mg Po BID PRN     melatonin 3 MG tablet Take 3 mg by mouth nightly as needed for sleep     multivitamin childrens (ANIMAL SHAPES) CHEW chewable tablet Take 2 tablets by mouth daily     ondansetron (ZOFRAN ODT) 8 MG ODT tab Take 8 mg by mouth every 8 hours as needed for nausea     oxyCODONE (ROXICODONE) 5 MG tablet Take 1-2 tablets (5-10 mg) by mouth every 6 hours as needed for severe pain     pantoprazole (PROTONIX) 40 MG EC tablet Take 40 mg by mouth 2 times daily      "polyethylene glycol 3350 POWD Take 17 g by mouth daily as needed (constipation)     potassium chloride jaqui ER (KLOR-CON M20) 20 MEQ CR tablet Take 20 mEq by mouth daily     predniSONE (DELTASONE) 5 MG tablet Take 5 mg by mouth daily     propylene glycol (SYSTANE BALANCE) 0.6 % SOLN ophthalmic solution Place 1 drop into both eyes 4 times daily as needed for dry eyes     senna-docusate (SENOKOT-S/PERICOLACE) 8.6-50 MG tablet Take 2 tablets by mouth 2 times daily     simethicone (MYLICON) 125 MG chewable tablet Take 125 mg by mouth every 6 hours as needed for intestinal gas     Vancomycin HCl 1250 MG/250ML SOLN Inject 1,250 mg into the vein daily For 27 days then discontinue     Vitamin D3 (CHOLECALCIFEROL) 25 mcg (1000 units) tablet Take 1 tablet by mouth daily     No current facility-administered medications for this visit.       ROS:  10 point ROS of systems including Constitutional, Eyes, Respiratory, Cardiovascular, Gastroenterology, Genitourinary, Integumentary, Musculoskeletal, Psychiatric were all negative except for pertinent positives noted in my HPI.    Vitals:  /81   Pulse 80   Temp 97.8  F (36.6  C)   Resp 16   Ht 1.702 m (5' 7\")   Wt 97.3 kg (214 lb 9.6 oz)   SpO2 95%   BMI 33.61 kg/m    Exam:  GENERAL APPEARANCE:  in no distress,   RESP:  Unlabored breathing. CTA b/l but coarse over the bases. No wheezing or crackles appreciated.   CV:  S1S2 audible, regular HR, no murmur appreciated.   ABDOMEN:  soft, NT/ND, BS audible.   M/S:   1+ edema over right leg. Non over left leg   SKIN:  healed sternotomy, small scab over the distal part. Healed tracheotomy site. Picc line in place. Shiny and tight skin over right leg.  Ostomy bag over PEG tube site, draining greenish fluid.   NEURO:   No NFD appreciated on observation.   PSYCH:  affect and mood normal     Lab/Diagnostic data: Reviewed in the chart and EHR.        ASSESSMENT/PLAN:  ----------------------------  Bacterial endocarditis, unspecified " chronicity  Prosthetic valve endocarditis, subsequent encounter  Pericardial effusion  Coronary artery disease involving native coronary artery of native heart without angina pectoris  Primary hypertension  Hyperlipidemia, unspecified hyperlipidemia type  Gastrocutaneous fistula due to gastrostomy tube  S/P aortic root replacement Bentall and homograft reimplantation of coronaries (2/22/24)  Gastric upset  -hx of AVR  in Sept 2023, complicated with  sternal wound dehiscence, mediastinitis, LLL collapse, critical illness myopathy, resulted in tracheostomy and PEG placement, dismissed to LTAC. PEG tube removed, later the site got infected resulted in spesis, and melissa the cause for the endocarditis.     -on vanc and rocephin, now, tolerating therapy. Clinically improving. ID following.    - Does not know if she has a follow up with CV. We reviewed hospital record, and shared with the patient that she need to follow up in 4-6 weeks with CV team in [facility]. Phone number provided to the patient.     -wound routine care.  Regarding when the fistula will stop draining, we do not know, the time will till. Pt unsure if has a follow up with general surgery, we reviewed the hospital record and it states that pt need to see general surgery in 4 week at [facility].     -analgesia optimal with the current regimen    -continue colchicine for pericardia effusion. Monitor for UGI irritation, worsening renal function, bleeding.     -reports poor appetite for she cannot take more than a few bites before her stomach start cramping. Does not like the taste of protein shake in general. Pt is on pantoprazole 40 mg bid already. On ASA and colchicine which could be contributing.       RLE DVT:  - had DVT and started on eliquis, repeat USG was negative, and eliquis was discontinued due to risk of bleed      COPD, moderate (H)  Chronic hypoxic and hypercapnia respiratory failure,   -  2-3 LPM O2 at home.   -history of  respiratory failure required ventilatory support  -  appears compensated. Continue current regimen  - continues to go outside and smoke while O2 is connected. We counseled on the risk of accident.     Blood loss anemia:  - clinically appears stable.       Chronic constipation  - now with watery stool, reports 6 episodes yesterday, seems stopped, and today had three. No other symptoms.   - will not prescribe  imodium as requested by the patient, will continue to monitor and see if the diarreha slows down after senna was stopped, if not, then will order c-diff lab.       Orders:  USG RLE rule out DVT.       ADDENDUM: USG negative for DVT      Electronically signed by:  Bozena Patel MD                     Sincerely,        Bozena Patel MD

## 2024-03-17 ENCOUNTER — LAB REQUISITION (OUTPATIENT)
Dept: LAB | Facility: CLINIC | Age: 69
End: 2024-03-17

## 2024-03-17 DIAGNOSIS — R78.81 BACTEREMIA: ICD-10-CM

## 2024-03-17 NOTE — PROGRESS NOTES
[facility] GERIATRICS  ACUTE/EPISODIC VISIT    [facility] Medical Record Number: [id number]  [facility] of Service where encounter took [facility]  [place] ([facility]) [[id number]]    Chief Complaint   Patient presents with    RECHECK     HPI:    Elly Corrales is a 68 year old (1955), who is being seen today for an episodic care visit. HPI information obtained from: facility chart records, facility staff, patient report, and [facility] chart review.    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 She continues on IV abx. She reports she si doing ok. Denies any pain. No SOB. Has been going outside to smoke - only has 3 cigarettes left then is going to quit. Was able to walk about 50ft with therapy. Has some loose stools, stool sample was collected over the weekend, but was not sent to lab within time window.     ALLERGIES:   Allergies   Allergen Reactions    Cephalexin Shortness Of Breath and Rash     And chest pain    Gabapentin Dizziness     Increased sedation even at 100 mg TID    Penicillin G Hives and Rash    Penicillins Hives and Rash      MEDICATIONS:  Post Discharge Medication Reconciliation Status: medication reconcilation previously completed during another office visit.     Current Outpatient Medications   Medication Sig Dispense Refill    acetaminophen (TYLENOL) 500 MG tablet Take 1,000 mg by mouth 4 times daily      albuterol (PROAIR HFA/PROVENTIL HFA/VENTOLIN HFA) 108 (90 Base) MCG/ACT inhaler Inhale 2 puffs into the lungs every 4 hours as needed for shortness of breath, wheezing or cough      albuterol (PROVENTIL) (2.5 MG/3ML) 0.083% neb solution Take 2.5 mg by nebulization 4 times daily as needed for shortness of breath, wheezing or cough      alteplase (CATHFLO ACTIVASE) 2 MG injection Inject 2 mg into the vein as needed      aspirin (ASA) 81 MG chewable tablet Take  81 mg by mouth daily      atorvastatin (LIPITOR) 40 MG tablet Take 40 mg by mouth daily      Calcium Carbonate Antacid 1177 MG CHEW Take 1 tablet by mouth 3 times daily      carvedilol (COREG) 25 MG tablet Take 25 mg by mouth 2 times daily (with meals)      cefTRIAXone (ROCEPHIN) 2 GM vial Inject 2 g into the vein every 12 hours For 28 days then discontinue      colchicine (COLCRYS) 0.6 MG tablet Take 0.6 mg by mouth 2 times daily      cyanocobalamin (VITAMIN B-12) 1000 MCG sublingual tablet Place 1 tablet under the tongue daily      DULoxetine (CYMBALTA) 60 MG capsule Take 60 mg by mouth daily      fluticasone (FLONASE) 50 MCG/ACT nasal spray Spray 1 spray into both nostrils daily as needed for rhinitis or allergies      Fluticasone-Umeclidin-Vilanterol (TRELEGY ELLIPTA) 200-62.5-25 MCG/ACT oral inhaler Inhale 1 puff into the lungs daily      furosemide (LASIX) 40 MG tablet Take 40 mg by mouth daily      guaiFENesin (MUCINEX) 600 MG 12 hr tablet Take 1,200 mg by mouth 2 times daily      hydrOXYzine deanna (VISTARIL) 25 MG capsule Take 25 mg by mouth 3 times daily as needed for itching      ipratropium - albuterol 0.5 mg/2.5 mg/3 mL (DUONEB) 0.5-2.5 (3) MG/3ML neb solution Take 1 vial by nebulization 4 times daily      levothyroxine (SYNTHROID/LEVOTHROID) 150 MCG tablet Take 150 mcg by mouth daily      lidocaine (LIDODERM) 5 % patch Place onto the skin every 24 hours To prevent lidocaine toxicity, patient should be patch free for 12 hrs daily.      loratadine (CLARITIN) 10 MG tablet Take 10 mg by mouth daily      losartan (COZAAR) 50 MG tablet Take 50 mg by mouth daily      magnesium oxide 400 MG tablet Take 400 mg by mouth daily      meclizine (ANTIVERT) 12.5 MG tablet Take 12.5 mg by mouth every morning And 12.5mg Po BID PRN      melatonin 3 MG tablet Take 3 mg by mouth nightly as needed for sleep      multivitamin childrens (ANIMAL SHAPES) CHEW chewable tablet Take 2 tablets by mouth daily      ondansetron (ZOFRAN  "ODT) 8 MG ODT tab Take 8 mg by mouth every 8 hours as needed for nausea      oxyCODONE (ROXICODONE) 5 MG tablet Take 1-2 tablets (5-10 mg) by mouth every 6 hours as needed for severe pain 30 tablet 0    pantoprazole (PROTONIX) 40 MG EC tablet Take 40 mg by mouth 2 times daily      polyethylene glycol 3350 POWD Take 17 g by mouth daily as needed (constipation)      potassium chloride jaqui ER (KLOR-CON M20) 20 MEQ CR tablet Take 20 mEq by mouth daily      predniSONE (DELTASONE) 5 MG tablet Take 5 mg by mouth daily      propylene glycol (SYSTANE BALANCE) 0.6 % SOLN ophthalmic solution Place 1 drop into both eyes 4 times daily as needed for dry eyes      senna-docusate (SENOKOT-S/PERICOLACE) 8.6-50 MG tablet Take 2 tablets by mouth 2 times daily      simethicone (MYLICON) 125 MG chewable tablet Take 125 mg by mouth every 6 hours as needed for intestinal gas      Vancomycin HCl 1250 MG/250ML SOLN Inject 1,250 mg into the vein daily For 27 days then discontinue      Vitamin D3 (CHOLECALCIFEROL) 25 mcg (1000 units) tablet Take 1 tablet by mouth daily       Medications reviewed:  Medications reconciled to facility chart and changes were made to reflect current medications as identified as above med list. Below are the changes that were made:   Medications stopped since last EPIC medication reconciliation:   There are no discontinued medications.    Medications started since last [facility] medication reconciliation:  No orders of the defined types were placed in this encounter.        REVIEW OF SYSTEMS:  4 point ROS neg other than the symptoms noted above in the HPI.      PHYSICAL EXAM:  BP (!) 167/70   Pulse (!) 5   Temp 98.6  F (37  C)   Resp 16   Ht 1.702 m (5' 7\")   Wt 95.6 kg (210 lb 12.8 oz)   SpO2 96%   BMI 33.02 kg/m    Physical Exam  Cardiovascular:      Rate and Rhythm: Normal rate and regular rhythm.      Heart sounds: Normal heart sounds.   Pulmonary:      Effort: Pulmonary effort is normal.      Breath sounds: " Normal breath sounds.      Comments: On 2L, diminished bilat bases  Abdominal:      General: Bowel sounds are normal.   Neurological:      Mental Status: She is alert.   Psychiatric:         Mood and Affect: Mood normal.         Thought Content: Thought content normal.         ASSESSMENT / PLAN:  Bacterial endocarditis, unspecified chronicity  Pericardial effusion  Prosthetic valve endocarditis, subsequent encounter  S/p bentall procedure. Appears to be healing well, no chest pain.Labs stable.  - IV Vanco and Rocephin x 28 days, IV pharmacy to dose vanco  - CBC, Creatinine 2x/week while on IV abx, fax lab results to ID.   - sternal precautions  - analgesia with APAP 1000mg QID, oxycodoone 5-10mg PRN  - PT/OT  - follow-up with CV surgery    Coronary artery disease involving native coronary artery of native heart without angina pectoris  Primary hypertension  Hyperlipidemia, unspecified hyperlipidemia type  CAD mild per history. No recent chest pain. -160  - daily weights  - continue Coreg, atorvastatin, losartan, ASA, furosemide  - follow-up with cardiology  - monitor and adjust     COPD, moderate (H)  Chronic respiratory failure with hypoxia and hypercapnia (H)  Appears compensated, does take oxygen off when she goes off to smoke, otherwise wears 2L. Therapy reports maintains sats with activity  - continue albuterol nebs/MDI PRN  - Trelegy inhaler daily  - oxygen 2-3L continuous     Chronic constipation  Reports some loose stools, is on abx  - holding senna-s  - sending stool for c.diff    Gastroesophageal reflux disease without esophagitis  - continue protonix    Physical deconditioning  Making progress with therapy; goal is to discharge home, does have PCA services  - PT/OT  -  to assist with discharge planning.       Smoker  Report she plans to quit once current pack completed. Requests family not be told she has been smoking  - declines gum or nicotine patch     Gastrocutaneous fistula due  to gastrostomy tube  Recent cellulitis, ? Source of endocarditis. Continue with drainage at site  - follow-up with surgery as scheduled on 3/20      Orders:  Send stool for c.diff    Electronically signed by:  TAMMIE Cruz CNP

## 2024-03-18 ENCOUNTER — TRANSITIONAL CARE UNIT VISIT (OUTPATIENT)
Dept: GERIATRICS | Facility: CLINIC | Age: 69
End: 2024-03-18
Payer: COMMERCIAL

## 2024-03-18 VITALS
TEMPERATURE: 98.6 F | WEIGHT: 210.8 LBS | HEIGHT: 67 IN | SYSTOLIC BLOOD PRESSURE: 167 MMHG | OXYGEN SATURATION: 96 % | RESPIRATION RATE: 16 BRPM | DIASTOLIC BLOOD PRESSURE: 70 MMHG | BODY MASS INDEX: 33.09 KG/M2 | HEART RATE: 5 BPM

## 2024-03-18 DIAGNOSIS — I38 PROSTHETIC VALVE ENDOCARDITIS, SUBSEQUENT ENCOUNTER: ICD-10-CM

## 2024-03-18 DIAGNOSIS — I33.0 BACTERIAL ENDOCARDITIS, UNSPECIFIED CHRONICITY: Primary | ICD-10-CM

## 2024-03-18 DIAGNOSIS — J44.9 COPD, MODERATE (H): ICD-10-CM

## 2024-03-18 DIAGNOSIS — J96.11 CHRONIC RESPIRATORY FAILURE WITH HYPOXIA AND HYPERCAPNIA (H): ICD-10-CM

## 2024-03-18 DIAGNOSIS — K31.6 GASTROCUTANEOUS FISTULA DUE TO GASTROSTOMY TUBE: ICD-10-CM

## 2024-03-18 DIAGNOSIS — K59.09 CHRONIC CONSTIPATION: ICD-10-CM

## 2024-03-18 DIAGNOSIS — R53.81 PHYSICAL DECONDITIONING: ICD-10-CM

## 2024-03-18 DIAGNOSIS — K21.9 GASTROESOPHAGEAL REFLUX DISEASE WITHOUT ESOPHAGITIS: ICD-10-CM

## 2024-03-18 DIAGNOSIS — F17.200 SMOKER: ICD-10-CM

## 2024-03-18 DIAGNOSIS — I25.10 CORONARY ARTERY DISEASE INVOLVING NATIVE CORONARY ARTERY OF NATIVE HEART WITHOUT ANGINA PECTORIS: ICD-10-CM

## 2024-03-18 DIAGNOSIS — T82.6XXD PROSTHETIC VALVE ENDOCARDITIS, SUBSEQUENT ENCOUNTER: ICD-10-CM

## 2024-03-18 DIAGNOSIS — E78.5 HYPERLIPIDEMIA, UNSPECIFIED HYPERLIPIDEMIA TYPE: ICD-10-CM

## 2024-03-18 DIAGNOSIS — I10 PRIMARY HYPERTENSION: ICD-10-CM

## 2024-03-18 DIAGNOSIS — J96.12 CHRONIC RESPIRATORY FAILURE WITH HYPOXIA AND HYPERCAPNIA (H): ICD-10-CM

## 2024-03-18 DIAGNOSIS — I31.39 PERICARDIAL EFFUSION: ICD-10-CM

## 2024-03-18 LAB
BASOPHILS # BLD AUTO: 0.1 10E3/UL (ref 0–0.2)
BASOPHILS NFR BLD AUTO: 1 %
CREAT SERPL-MCNC: 0.9 MG/DL (ref 0.51–0.95)
EGFRCR SERPLBLD CKD-EPI 2021: 70 ML/MIN/1.73M2
EOSINOPHIL # BLD AUTO: 0.4 10E3/UL (ref 0–0.7)
EOSINOPHIL NFR BLD AUTO: 5 %
IMM GRANULOCYTES # BLD: 0.1 10E3/UL
IMM GRANULOCYTES NFR BLD: 1 %
LYMPHOCYTES # BLD AUTO: 1.3 10E3/UL (ref 0.8–5.3)
LYMPHOCYTES NFR BLD AUTO: 18 %
MONOCYTES # BLD AUTO: 1.1 10E3/UL (ref 0–1.3)
MONOCYTES NFR BLD AUTO: 15 %
NEUTROPHILS # BLD AUTO: 4.3 10E3/UL (ref 1.6–8.3)
NEUTROPHILS NFR BLD AUTO: 59 %
NRBC # BLD AUTO: 0 10E3/UL
NRBC BLD AUTO-RTO: 0 /100
VANCOMYCIN SERPL-MCNC: 14.1 UG/ML
WBC # BLD AUTO: 7.2 10E3/UL (ref 4–11)

## 2024-03-18 PROCEDURE — 82565 ASSAY OF CREATININE: CPT | Performed by: NURSE PRACTITIONER

## 2024-03-18 PROCEDURE — 99309 SBSQ NF CARE MODERATE MDM 30: CPT | Performed by: NURSE PRACTITIONER

## 2024-03-18 PROCEDURE — 80202 ASSAY OF VANCOMYCIN: CPT | Performed by: NURSE PRACTITIONER

## 2024-03-18 PROCEDURE — 85048 AUTOMATED LEUKOCYTE COUNT: CPT | Performed by: NURSE PRACTITIONER

## 2024-03-18 PROCEDURE — P9603 ONE-WAY ALLOW PRORATED MILES: HCPCS | Performed by: NURSE PRACTITIONER

## 2024-03-18 PROCEDURE — 36415 COLL VENOUS BLD VENIPUNCTURE: CPT | Performed by: NURSE PRACTITIONER

## 2024-03-18 NOTE — LETTER
3/18/2024        RE: Elly Corrales  [address]  [facility] [place] [address]        [facility] GERIATRICS  ACUTE/EPISODIC VISIT    [facility] Medical Record Number: [id number]  Place of Service where encounter took place: [facility] [place] ([facility]) [[id number]]    Chief Complaint   Patient presents with     RECHECK     HPI:    Elly Corrales is a 68 year old (1955), who is being seen today for an episodic care visit. HPI information obtained from: facility chart records, facility staff, patient report, and [place] Epic chart review.    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 She continues on IV abx. She reports she si doing ok. Denies any pain. No SOB. Has been going outside to smoke - only has 3 cigarettes left then is going to quit. Was able to walk about 50ft with therapy. Has some loose stools, stool sample was collected over the weekend, but was not sent to lab within time window.     ALLERGIES:   Allergies   Allergen Reactions     Cephalexin Shortness Of Breath and Rash     And chest pain     Gabapentin Dizziness     Increased sedation even at 100 mg TID     Penicillin G Hives and Rash     Penicillins Hives and Rash      MEDICATIONS:  Post Discharge Medication Reconciliation Status: medication reconcilation previously completed during another office visit.     Current Outpatient Medications   Medication Sig Dispense Refill     acetaminophen (TYLENOL) 500 MG tablet Take 1,000 mg by mouth 4 times daily       albuterol (PROAIR HFA/PROVENTIL HFA/VENTOLIN HFA) 108 (90 Base) MCG/ACT inhaler Inhale 2 puffs into the lungs every 4 hours as needed for shortness of breath, wheezing or cough       albuterol (PROVENTIL) (2.5 MG/3ML) 0.083% neb solution Take 2.5 mg by nebulization 4 times daily as needed for shortness of breath, wheezing or cough       alteplase (CATHFLO  ACTIVASE) 2 MG injection Inject 2 mg into the vein as needed       aspirin (ASA) 81 MG chewable tablet Take 81 mg by mouth daily       atorvastatin (LIPITOR) 40 MG tablet Take 40 mg by mouth daily       Calcium Carbonate Antacid 1177 MG CHEW Take 1 tablet by mouth 3 times daily       carvedilol (COREG) 25 MG tablet Take 25 mg by mouth 2 times daily (with meals)       cefTRIAXone (ROCEPHIN) 2 GM vial Inject 2 g into the vein every 12 hours For 28 days then discontinue       colchicine (COLCRYS) 0.6 MG tablet Take 0.6 mg by mouth 2 times daily       cyanocobalamin (VITAMIN B-12) 1000 MCG sublingual tablet Place 1 tablet under the tongue daily       DULoxetine (CYMBALTA) 60 MG capsule Take 60 mg by mouth daily       fluticasone (FLONASE) 50 MCG/ACT nasal spray Spray 1 spray into both nostrils daily as needed for rhinitis or allergies       Fluticasone-Umeclidin-Vilanterol (TRELEGY ELLIPTA) 200-62.5-25 MCG/ACT oral inhaler Inhale 1 puff into the lungs daily       furosemide (LASIX) 40 MG tablet Take 40 mg by mouth daily       guaiFENesin (MUCINEX) 600 MG 12 hr tablet Take 1,200 mg by mouth 2 times daily       hydrOXYzine deanna (VISTARIL) 25 MG capsule Take 25 mg by mouth 3 times daily as needed for itching       ipratropium - albuterol 0.5 mg/2.5 mg/3 mL (DUONEB) 0.5-2.5 (3) MG/3ML neb solution Take 1 vial by nebulization 4 times daily       levothyroxine (SYNTHROID/LEVOTHROID) 150 MCG tablet Take 150 mcg by mouth daily       lidocaine (LIDODERM) 5 % patch Place onto the skin every 24 hours To prevent lidocaine toxicity, patient should be patch free for 12 hrs daily.       loratadine (CLARITIN) 10 MG tablet Take 10 mg by mouth daily       losartan (COZAAR) 50 MG tablet Take 50 mg by mouth daily       magnesium oxide 400 MG tablet Take 400 mg by mouth daily       meclizine (ANTIVERT) 12.5 MG tablet Take 12.5 mg by mouth every morning And 12.5mg Po BID PRN       melatonin 3 MG tablet Take 3 mg by mouth nightly as needed for  "sleep       multivitamin childrens (ANIMAL SHAPES) CHEW chewable tablet Take 2 tablets by mouth daily       ondansetron (ZOFRAN ODT) 8 MG ODT tab Take 8 mg by mouth every 8 hours as needed for nausea       oxyCODONE (ROXICODONE) 5 MG tablet Take 1-2 tablets (5-10 mg) by mouth every 6 hours as needed for severe pain 30 tablet 0     pantoprazole (PROTONIX) 40 MG EC tablet Take 40 mg by mouth 2 times daily       polyethylene glycol 3350 POWD Take 17 g by mouth daily as needed (constipation)       potassium chloride jaqui ER (KLOR-CON M20) 20 MEQ CR tablet Take 20 mEq by mouth daily       predniSONE (DELTASONE) 5 MG tablet Take 5 mg by mouth daily       propylene glycol (SYSTANE BALANCE) 0.6 % SOLN ophthalmic solution Place 1 drop into both eyes 4 times daily as needed for dry eyes       senna-docusate (SENOKOT-S/PERICOLACE) 8.6-50 MG tablet Take 2 tablets by mouth 2 times daily       simethicone (MYLICON) 125 MG chewable tablet Take 125 mg by mouth every 6 hours as needed for intestinal gas       Vancomycin HCl 1250 MG/250ML SOLN Inject 1,250 mg into the vein daily For 27 days then discontinue       Vitamin D3 (CHOLECALCIFEROL) 25 mcg (1000 units) tablet Take 1 tablet by mouth daily       Medications reviewed:  Medications reconciled to facility chart and changes were made to reflect current medications as identified as above med list. Below are the changes that were made:   Medications stopped since last EPIC medication reconciliation:   There are no discontinued medications.    Medications started since last [facility] medication reconciliation:  No orders of the defined types were placed in this encounter.        REVIEW OF SYSTEMS:  4 point ROS neg other than the symptoms noted above in the HPI.      PHYSICAL EXAM:  BP (!) 167/70   Pulse (!) 5   Temp 98.6  F (37  C)   Resp 16   Ht 1.702 m (5' 7\")   Wt 95.6 kg (210 lb 12.8 oz)   SpO2 96%   BMI 33.02 kg/m    Physical Exam  Cardiovascular:      Rate and Rhythm: Normal " rate and regular rhythm.      Heart sounds: Normal heart sounds.   Pulmonary:      Effort: Pulmonary effort is normal.      Breath sounds: Normal breath sounds.      Comments: On 2L, diminished bilat bases  Abdominal:      General: Bowel sounds are normal.   Neurological:      Mental Status: She is alert.   Psychiatric:         Mood and Affect: Mood normal.         Thought Content: Thought content normal.         ASSESSMENT / PLAN:  Bacterial endocarditis, unspecified chronicity  Pericardial effusion  Prosthetic valve endocarditis, subsequent encounter  S/p bentall procedure. Appears to be healing well, no chest pain.Labs stable.  - IV Vanco and Rocephin x 28 days, IV pharmacy to dose vanco  - CBC, Creatinine 2x/week while on IV abx, fax lab results to ID.   - sternal precautions  - analgesia with APAP 1000mg QID, oxycodoone 5-10mg PRN  - PT/OT  - follow-up with CV surgery    Coronary artery disease involving native coronary artery of native heart without angina pectoris  Primary hypertension  Hyperlipidemia, unspecified hyperlipidemia type  CAD mild per history. No recent chest pain. -160  - daily weights  - continue Coreg, atorvastatin, losartan, ASA, furosemide  - follow-up with cardiology  - monitor and adjust     COPD, moderate (H)  Chronic respiratory failure with hypoxia and hypercapnia (H)  Appears compensated, does take oxygen off when she goes off to smoke, otherwise wears 2L. Therapy reports maintains sats with activity  - continue albuterol nebs/MDI PRN  - Trelegy inhaler daily  - oxygen 2-3L continuous     Chronic constipation  Reports some loose stools, is on abx  - holding senna-s  - sending stool for c.diff    Gastroesophageal reflux disease without esophagitis  - continue protonix    Physical deconditioning  Making progress with therapy; goal is to discharge home, does have PCA services  - PT/OT  -  to assist with discharge planning.       Smoker  Report she plans to quit once  current pack completed. Requests family not be told she has been smoking  - declines gum or nicotine patch     Gastrocutaneous fistula due to gastrostomy tube  Recent cellulitis, ? Source of endocarditis. Continue with drainage at site  - follow-up with surgery as scheduled on 3/20      Orders:  Send stool for c.diff    Electronically signed by:  TAMMIE Cruz CNP      Sincerely,        TAMMIE Cruz CNP

## 2024-03-20 ENCOUNTER — LAB REQUISITION (OUTPATIENT)
Dept: LAB | Facility: CLINIC | Age: 69
End: 2024-03-20

## 2024-03-20 DIAGNOSIS — R78.81 BACTEREMIA: ICD-10-CM

## 2024-03-21 DIAGNOSIS — M25.511 CHRONIC RIGHT SHOULDER PAIN: ICD-10-CM

## 2024-03-21 DIAGNOSIS — G89.29 CHRONIC RIGHT SHOULDER PAIN: ICD-10-CM

## 2024-03-21 LAB
CREAT SERPL-MCNC: 0.93 MG/DL (ref 0.51–0.95)
EGFRCR SERPLBLD CKD-EPI 2021: 67 ML/MIN/1.73M2
VANCOMYCIN SERPL-MCNC: 14.9 UG/ML

## 2024-03-21 PROCEDURE — P9603 ONE-WAY ALLOW PRORATED MILES: HCPCS | Performed by: NURSE PRACTITIONER

## 2024-03-21 PROCEDURE — 80202 ASSAY OF VANCOMYCIN: CPT | Performed by: NURSE PRACTITIONER

## 2024-03-21 PROCEDURE — 36415 COLL VENOUS BLD VENIPUNCTURE: CPT | Performed by: NURSE PRACTITIONER

## 2024-03-21 PROCEDURE — 82565 ASSAY OF CREATININE: CPT | Performed by: NURSE PRACTITIONER

## 2024-03-21 RX ORDER — OXYCODONE HYDROCHLORIDE 5 MG/1
5-10 TABLET ORAL EVERY 6 HOURS PRN
Qty: 30 TABLET | Refills: 0 | Status: SHIPPED | OUTPATIENT
Start: 2024-03-21 | End: 2024-04-01

## 2024-03-23 NOTE — PROGRESS NOTES
[facility] GERIATRICS  ACUTE/EPISODIC VISIT    [facility] Medical Record Number: [id number]  Place of Service where encounter took place: [facility] ([facility]) [[id number]]    Chief Complaint   Patient presents with    RECHECK     HPI:    Elly Corrales is a 68 year old (1955), who is being seen today for an episodic care visit. HPI information obtained from: facility chart records, facility staff, patient report, and [facility] chart review.    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 She continues on IV abx. She is in good spirits on visit today, was able to walk 98ft with therapy, which is the furthest distance yet that she has ambulated. She reports she was not feeling well last week, but is feeling much better today. Appetite is good. Stools are firming up. Has been sleeping well. Feels her breathing is doing well. She does report some vaginal itching and discharge for the past few days.      ALLERGIES:   Allergies   Allergen Reactions    Cephalexin Shortness Of Breath and Rash     And chest pain    Gabapentin Dizziness     Increased sedation even at 100 mg TID    Penicillin G Hives and Rash    Penicillins Hives and Rash      MEDICATIONS:  Post Discharge Medication Reconciliation Status: medication reconcilation previously completed during another office visit.     Current Outpatient Medications   Medication Sig Dispense Refill    miconazole (MICATIN) 200 MG vaginal suppository Place 1 suppository (200 mg) vaginally at bedtime for 3 days      nicotine (NICODERM CQ) 7 MG/24HR 24 hr patch Place 1 patch onto the skin daily as needed for nicotine withdrawal symptoms      acetaminophen (TYLENOL) 500 MG tablet Take 1,000 mg by mouth 4 times daily      albuterol (PROAIR HFA/PROVENTIL HFA/VENTOLIN HFA) 108 (90 Base) MCG/ACT inhaler Inhale 2 puffs into the lungs every 4 hours  as needed for shortness of breath, wheezing or cough      albuterol (PROVENTIL) (2.5 MG/3ML) 0.083% neb solution Take 2.5 mg by nebulization 4 times daily as needed for shortness of breath, wheezing or cough      alteplase (CATHFLO ACTIVASE) 2 MG injection Inject 2 mg into the vein as needed      aspirin (ASA) 81 MG chewable tablet Take 81 mg by mouth daily      atorvastatin (LIPITOR) 40 MG tablet Take 40 mg by mouth daily      Calcium Carbonate Antacid 1177 MG CHEW Take 1 tablet by mouth 3 times daily      carvedilol (COREG) 25 MG tablet Take 25 mg by mouth 2 times daily (with meals)      cefTRIAXone (ROCEPHIN) 2 GM vial Inject 2 g into the vein every 12 hours For 28 days then discontinue      colchicine (COLCRYS) 0.6 MG tablet Take 0.6 mg by mouth 2 times daily      cyanocobalamin (VITAMIN B-12) 1000 MCG sublingual tablet Place 1 tablet under the tongue daily      DULoxetine (CYMBALTA) 60 MG capsule Take 60 mg by mouth daily      fluticasone (FLONASE) 50 MCG/ACT nasal spray Spray 1 spray into both nostrils daily as needed for rhinitis or allergies      Fluticasone-Umeclidin-Vilanterol (TRELEGY ELLIPTA) 200-62.5-25 MCG/ACT oral inhaler Inhale 1 puff into the lungs daily      furosemide (LASIX) 40 MG tablet Take 40 mg by mouth daily      guaiFENesin (MUCINEX) 600 MG 12 hr tablet Take 1,200 mg by mouth 2 times daily      hydrOXYzine deanna (VISTARIL) 25 MG capsule Take 25 mg by mouth 3 times daily as needed for itching      ipratropium - albuterol 0.5 mg/2.5 mg/3 mL (DUONEB) 0.5-2.5 (3) MG/3ML neb solution Take 1 vial by nebulization 4 times daily      levothyroxine (SYNTHROID/LEVOTHROID) 150 MCG tablet Take 150 mcg by mouth daily      lidocaine (LIDODERM) 5 % patch Place onto the skin every 24 hours To prevent lidocaine toxicity, patient should be patch free for 12 hrs daily.      loratadine (CLARITIN) 10 MG tablet Take 10 mg by mouth daily      losartan (COZAAR) 50 MG tablet Take 50 mg by mouth daily      magnesium  oxide 400 MG tablet Take 400 mg by mouth daily      meclizine (ANTIVERT) 12.5 MG tablet Take 12.5 mg by mouth every morning And 12.5mg Po BID PRN      melatonin 3 MG tablet Take 3 mg by mouth nightly as needed for sleep      multivitamin childrens (ANIMAL SHAPES) CHEW chewable tablet Take 2 tablets by mouth daily      ondansetron (ZOFRAN ODT) 8 MG ODT tab Take 8 mg by mouth every 8 hours as needed for nausea      oxyCODONE (ROXICODONE) 5 MG tablet Take 1-2 tablets (5-10 mg) by mouth every 6 hours as needed for severe pain 30 tablet 0    pantoprazole (PROTONIX) 40 MG EC tablet Take 40 mg by mouth 2 times daily      polyethylene glycol 3350 POWD Take 17 g by mouth daily as needed (constipation)      potassium chloride jaqui ER (KLOR-CON M20) 20 MEQ CR tablet Take 20 mEq by mouth daily      predniSONE (DELTASONE) 5 MG tablet Take 5 mg by mouth daily      propylene glycol (SYSTANE BALANCE) 0.6 % SOLN ophthalmic solution Place 1 drop into both eyes 4 times daily as needed for dry eyes      senna-docusate (SENOKOT-S/PERICOLACE) 8.6-50 MG tablet Take 2 tablets by mouth 2 times daily      simethicone (MYLICON) 125 MG chewable tablet Take 125 mg by mouth every 6 hours as needed for intestinal gas      Vancomycin HCl 1250 MG/250ML SOLN Inject 1,250 mg into the vein daily For 27 days then discontinue      Vitamin D3 (CHOLECALCIFEROL) 25 mcg (1000 units) tablet Take 1 tablet by mouth daily       Medications reviewed:  Medications reconciled to facility chart and changes were made to reflect current medications as identified as above med list. Below are the changes that were made:   Medications stopped since last EPIC medication reconciliation:   There are no discontinued medications.    Medications started since last [facility] medication reconciliation:  No orders of the defined types were placed in this encounter.        REVIEW OF SYSTEMS:  4 point ROS neg other than the symptoms noted above in the HPI.      PHYSICAL EXAM:  BP (!)  "162/72   Pulse 88   Temp 98.3  F (36.8  C)   Resp 20   Ht 1.702 m (5' 7\")   Wt 103.6 kg (228 lb 8 oz)   SpO2 94%   BMI 35.79 kg/m    Physical Exam  Cardiovascular:      Rate and Rhythm: Normal rate and regular rhythm.      Heart sounds: Normal heart sounds.   Pulmonary:      Effort: Pulmonary effort is normal.      Breath sounds: Normal breath sounds.      Comments: Diminished bilat bases, on 2L NC  Abdominal:      General: Bowel sounds are normal.      Palpations: Abdomen is soft.   Musculoskeletal:      Right lower leg: No edema.      Left lower leg: No edema.   Skin:     Comments: Sternal incision CDI, SARAHI; dressing to tube feed site CDI   Neurological:      Mental Status: She is alert.   Psychiatric:         Mood and Affect: Mood normal.         Thought Content: Thought content normal.         ASSESSMENT / PLAN:  Bacterial endocarditis, unspecified chronicity  Pericardial effusion  Prosthetic valve endocarditis, subsequent encounter  Coronary artery disease involving native coronary artery of native heart without angina pectoris  Primary hypertension  Hyperlipidemia, unspecified hyperlipidemia type  S/p bentall procedure. Appears to be healing well, no chest pain. Labs stable.-160, Hr 70-80. Weight stable ~230lbs.   - IV Vanco and Rocephin x 28 days, IV pharmacy to dose vanco  - CBC, Creatinine 2x/week while on IV abx, fax lab results to ID.   - sternal precautions  - analgesia with APAP 1000mg QID, oxycodoone 5-10mg PRN  - PT/OT  - follow-up with CV surgery    COPD, moderate (H)  Chronic respiratory failure with hypoxia and hypercapnia (H)  Appears compensated. does take oxygen off when she goes off to smoke, otherwise wears 2L. Maintains sats with activity. Denies any wheezing.   - continue albuterol nebs/MDI PRN  - Trelegy inhaler daily  - oxygen 2-3L continuous     Smoker  Continues to smoke in TCU, has had multiple discussions about cessation with provider and   - nicotine " (NICODERM CQ) 7 MG/24HR 24 hr patch; Place 1 patch onto the skin daily as needed for nicotine withdrawal symptoms    Yeast infection of the vagina  UC negative, alston report vaginal discharge, itching  - miconazole (MICATIN) 200 MG vaginal suppository; Place 1 suppository (200 mg) vaginally at bedtime for 3 days    Gastrocutaneous fistula due to gastrostomy tube  Recent cellulitis, ? Source of endocarditis. Continue with drainage at site. Had follow-up with surgery on 3/20, recommended upper GI study,   - follow-up with GI  - continue current cares     Physical deconditioning  Making progress with therapy, able to walk ~100ft today  - PT/OT        Orders:  Monistat 3 at bedtime x 3 days  Nicotine patch daily PRN    Electronically signed by:  TAMMIE Cruz CNP

## 2024-03-24 ENCOUNTER — LAB REQUISITION (OUTPATIENT)
Dept: LAB | Facility: CLINIC | Age: 69
End: 2024-03-24

## 2024-03-24 DIAGNOSIS — R78.81 BACTEREMIA: ICD-10-CM

## 2024-03-25 ENCOUNTER — TRANSITIONAL CARE UNIT VISIT (OUTPATIENT)
Dept: GERIATRICS | Facility: CLINIC | Age: 69
End: 2024-03-25
Payer: COMMERCIAL

## 2024-03-25 VITALS
BODY MASS INDEX: 35.87 KG/M2 | SYSTOLIC BLOOD PRESSURE: 162 MMHG | WEIGHT: 228.5 LBS | TEMPERATURE: 98.3 F | OXYGEN SATURATION: 94 % | HEIGHT: 67 IN | RESPIRATION RATE: 20 BRPM | DIASTOLIC BLOOD PRESSURE: 72 MMHG | HEART RATE: 88 BPM

## 2024-03-25 DIAGNOSIS — E78.5 HYPERLIPIDEMIA, UNSPECIFIED HYPERLIPIDEMIA TYPE: ICD-10-CM

## 2024-03-25 DIAGNOSIS — I31.39 PERICARDIAL EFFUSION: ICD-10-CM

## 2024-03-25 DIAGNOSIS — T82.6XXD PROSTHETIC VALVE ENDOCARDITIS, SUBSEQUENT ENCOUNTER: ICD-10-CM

## 2024-03-25 DIAGNOSIS — I33.0 BACTERIAL ENDOCARDITIS, UNSPECIFIED CHRONICITY: Primary | ICD-10-CM

## 2024-03-25 DIAGNOSIS — R53.81 PHYSICAL DECONDITIONING: ICD-10-CM

## 2024-03-25 DIAGNOSIS — I10 PRIMARY HYPERTENSION: ICD-10-CM

## 2024-03-25 DIAGNOSIS — J44.9 COPD, MODERATE (H): ICD-10-CM

## 2024-03-25 DIAGNOSIS — J96.11 CHRONIC RESPIRATORY FAILURE WITH HYPOXIA AND HYPERCAPNIA (H): ICD-10-CM

## 2024-03-25 DIAGNOSIS — I25.10 CORONARY ARTERY DISEASE INVOLVING NATIVE CORONARY ARTERY OF NATIVE HEART WITHOUT ANGINA PECTORIS: ICD-10-CM

## 2024-03-25 DIAGNOSIS — B37.31 YEAST INFECTION OF THE VAGINA: ICD-10-CM

## 2024-03-25 DIAGNOSIS — F17.200 SMOKER: ICD-10-CM

## 2024-03-25 DIAGNOSIS — J96.12 CHRONIC RESPIRATORY FAILURE WITH HYPOXIA AND HYPERCAPNIA (H): ICD-10-CM

## 2024-03-25 DIAGNOSIS — I38 PROSTHETIC VALVE ENDOCARDITIS, SUBSEQUENT ENCOUNTER: ICD-10-CM

## 2024-03-25 LAB
BASOPHILS # BLD AUTO: 0.1 10E3/UL (ref 0–0.2)
BASOPHILS NFR BLD AUTO: 2 %
CREAT SERPL-MCNC: 0.9 MG/DL (ref 0.51–0.95)
EGFRCR SERPLBLD CKD-EPI 2021: 70 ML/MIN/1.73M2
EOSINOPHIL # BLD AUTO: 0.4 10E3/UL (ref 0–0.7)
EOSINOPHIL NFR BLD AUTO: 5 %
IMM GRANULOCYTES # BLD: 0.1 10E3/UL
IMM GRANULOCYTES NFR BLD: 1 %
LYMPHOCYTES # BLD AUTO: 1.3 10E3/UL (ref 0.8–5.3)
LYMPHOCYTES NFR BLD AUTO: 18 %
MONOCYTES # BLD AUTO: 1.1 10E3/UL (ref 0–1.3)
MONOCYTES NFR BLD AUTO: 15 %
NEUTROPHILS # BLD AUTO: 4.4 10E3/UL (ref 1.6–8.3)
NEUTROPHILS NFR BLD AUTO: 60 %
NRBC # BLD AUTO: 0 10E3/UL
NRBC BLD AUTO-RTO: 0 /100
VANCOMYCIN SERPL-MCNC: 14.2 UG/ML
WBC # BLD AUTO: 7.3 10E3/UL (ref 4–11)

## 2024-03-25 PROCEDURE — 36415 COLL VENOUS BLD VENIPUNCTURE: CPT | Performed by: NURSE PRACTITIONER

## 2024-03-25 PROCEDURE — 80202 ASSAY OF VANCOMYCIN: CPT | Performed by: NURSE PRACTITIONER

## 2024-03-25 PROCEDURE — 85048 AUTOMATED LEUKOCYTE COUNT: CPT | Performed by: NURSE PRACTITIONER

## 2024-03-25 PROCEDURE — 82565 ASSAY OF CREATININE: CPT | Performed by: NURSE PRACTITIONER

## 2024-03-25 PROCEDURE — P9604 ONE-WAY ALLOW PRORATED TRIP: HCPCS | Performed by: NURSE PRACTITIONER

## 2024-03-25 PROCEDURE — 99309 SBSQ NF CARE MODERATE MDM 30: CPT | Performed by: NURSE PRACTITIONER

## 2024-03-25 NOTE — LETTER
3/25/2024        RE: Elly Corrales  [id number]  [place] [id number]        [facility] GERIATRICS  ACUTE/EPISODIC VISIT    [facility] Medical Record Number: [id number]  Place of Service where encounter took place: [facility] [place] () [[id number]]    Chief Complaint   Patient presents with     RECHECK     HPI:    Elly Corrales is a 68 year old (1955), who is being seen today for an episodic care visit. HPI information obtained from: facility chart records, facility staff, patient report, and [facility] chart review.    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 She continues on IV abx. She is in good spirits on visit today, was able to walk 98ft with therapy, which is the furthest distance yet that she has ambulated. She reports she was not feeling well last week, but is feeling much better today. Appetite is good. Stools are firming up. Has been sleeping well. Feels her breathing is doing well. She does report some vaginal itching and discharge for the past few days.      ALLERGIES:   Allergies   Allergen Reactions     Cephalexin Shortness Of Breath and Rash     And chest pain     Gabapentin Dizziness     Increased sedation even at 100 mg TID     Penicillin G Hives and Rash     Penicillins Hives and Rash      MEDICATIONS:  Post Discharge Medication Reconciliation Status: medication reconcilation previously completed during another office visit.     Current Outpatient Medications   Medication Sig Dispense Refill     miconazole (MICATIN) 200 MG vaginal suppository Place 1 suppository (200 mg) vaginally at bedtime for 3 days       nicotine (NICODERM CQ) 7 MG/24HR 24 hr patch Place 1 patch onto the skin daily as needed for nicotine withdrawal symptoms       acetaminophen (TYLENOL) 500 MG tablet Take 1,000 mg by mouth 4 times daily       albuterol (PROAIR  HFA/PROVENTIL HFA/VENTOLIN HFA) 108 (90 Base) MCG/ACT inhaler Inhale 2 puffs into the lungs every 4 hours as needed for shortness of breath, wheezing or cough       albuterol (PROVENTIL) (2.5 MG/3ML) 0.083% neb solution Take 2.5 mg by nebulization 4 times daily as needed for shortness of breath, wheezing or cough       alteplase (CATHFLO ACTIVASE) 2 MG injection Inject 2 mg into the vein as needed       aspirin (ASA) 81 MG chewable tablet Take 81 mg by mouth daily       atorvastatin (LIPITOR) 40 MG tablet Take 40 mg by mouth daily       Calcium Carbonate Antacid 1177 MG CHEW Take 1 tablet by mouth 3 times daily       carvedilol (COREG) 25 MG tablet Take 25 mg by mouth 2 times daily (with meals)       cefTRIAXone (ROCEPHIN) 2 GM vial Inject 2 g into the vein every 12 hours For 28 days then discontinue       colchicine (COLCRYS) 0.6 MG tablet Take 0.6 mg by mouth 2 times daily       cyanocobalamin (VITAMIN B-12) 1000 MCG sublingual tablet Place 1 tablet under the tongue daily       DULoxetine (CYMBALTA) 60 MG capsule Take 60 mg by mouth daily       fluticasone (FLONASE) 50 MCG/ACT nasal spray Spray 1 spray into both nostrils daily as needed for rhinitis or allergies       Fluticasone-Umeclidin-Vilanterol (TRELEGY ELLIPTA) 200-62.5-25 MCG/ACT oral inhaler Inhale 1 puff into the lungs daily       furosemide (LASIX) 40 MG tablet Take 40 mg by mouth daily       guaiFENesin (MUCINEX) 600 MG 12 hr tablet Take 1,200 mg by mouth 2 times daily       hydrOXYzine deanna (VISTARIL) 25 MG capsule Take 25 mg by mouth 3 times daily as needed for itching       ipratropium - albuterol 0.5 mg/2.5 mg/3 mL (DUONEB) 0.5-2.5 (3) MG/3ML neb solution Take 1 vial by nebulization 4 times daily       levothyroxine (SYNTHROID/LEVOTHROID) 150 MCG tablet Take 150 mcg by mouth daily       lidocaine (LIDODERM) 5 % patch Place onto the skin every 24 hours To prevent lidocaine toxicity, patient should be patch free for 12 hrs daily.       loratadine  (CLARITIN) 10 MG tablet Take 10 mg by mouth daily       losartan (COZAAR) 50 MG tablet Take 50 mg by mouth daily       magnesium oxide 400 MG tablet Take 400 mg by mouth daily       meclizine (ANTIVERT) 12.5 MG tablet Take 12.5 mg by mouth every morning And 12.5mg Po BID PRN       melatonin 3 MG tablet Take 3 mg by mouth nightly as needed for sleep       multivitamin childrens (ANIMAL SHAPES) CHEW chewable tablet Take 2 tablets by mouth daily       ondansetron (ZOFRAN ODT) 8 MG ODT tab Take 8 mg by mouth every 8 hours as needed for nausea       oxyCODONE (ROXICODONE) 5 MG tablet Take 1-2 tablets (5-10 mg) by mouth every 6 hours as needed for severe pain 30 tablet 0     pantoprazole (PROTONIX) 40 MG EC tablet Take 40 mg by mouth 2 times daily       polyethylene glycol 3350 POWD Take 17 g by mouth daily as needed (constipation)       potassium chloride jaqui ER (KLOR-CON M20) 20 MEQ CR tablet Take 20 mEq by mouth daily       predniSONE (DELTASONE) 5 MG tablet Take 5 mg by mouth daily       propylene glycol (SYSTANE BALANCE) 0.6 % SOLN ophthalmic solution Place 1 drop into both eyes 4 times daily as needed for dry eyes       senna-docusate (SENOKOT-S/PERICOLACE) 8.6-50 MG tablet Take 2 tablets by mouth 2 times daily       simethicone (MYLICON) 125 MG chewable tablet Take 125 mg by mouth every 6 hours as needed for intestinal gas       Vancomycin HCl 1250 MG/250ML SOLN Inject 1,250 mg into the vein daily For 27 days then discontinue       Vitamin D3 (CHOLECALCIFEROL) 25 mcg (1000 units) tablet Take 1 tablet by mouth daily       Medications reviewed:  Medications reconciled to facility chart and changes were made to reflect current medications as identified as above med list. Below are the changes that were made:   Medications stopped since last EPIC medication reconciliation:   There are no discontinued medications.    Medications started since last [facility] medication reconciliation:  No orders of the defined types were  "placed in this encounter.        REVIEW OF SYSTEMS:  4 point ROS neg other than the symptoms noted above in the HPI.      PHYSICAL EXAM:  BP (!) 162/72   Pulse 88   Temp 98.3  F (36.8  C)   Resp 20   Ht 1.702 m (5' 7\")   Wt 103.6 kg (228 lb 8 oz)   SpO2 94%   BMI 35.79 kg/m    Physical Exam  Cardiovascular:      Rate and Rhythm: Normal rate and regular rhythm.      Heart sounds: Normal heart sounds.   Pulmonary:      Effort: Pulmonary effort is normal.      Breath sounds: Normal breath sounds.      Comments: Diminished bilat bases, on 2L NC  Abdominal:      General: Bowel sounds are normal.      Palpations: Abdomen is soft.   Musculoskeletal:      Right lower leg: No edema.      Left lower leg: No edema.   Skin:     Comments: Sternal incision CDI, SARAHI; dressing to tube feed site CDI   Neurological:      Mental Status: She is alert.   Psychiatric:         Mood and Affect: Mood normal.         Thought Content: Thought content normal.         ASSESSMENT / PLAN:  Bacterial endocarditis, unspecified chronicity  Pericardial effusion  Prosthetic valve endocarditis, subsequent encounter  Coronary artery disease involving native coronary artery of native heart without angina pectoris  Primary hypertension  Hyperlipidemia, unspecified hyperlipidemia type  S/p bentall procedure. Appears to be healing well, no chest pain. Labs stable.-160, Hr 70-80. Weight stable ~230lbs.   - IV Vanco and Rocephin x 28 days, IV pharmacy to dose vanco  - CBC, Creatinine 2x/week while on IV abx, fax lab results to ID.   - sternal precautions  - analgesia with APAP 1000mg QID, oxycodoone 5-10mg PRN  - PT/OT  - follow-up with CV surgery    COPD, moderate (H)  Chronic respiratory failure with hypoxia and hypercapnia (H)  Appears compensated. does take oxygen off when she goes off to smoke, otherwise wears 2L. Maintains sats with activity. Denies any wheezing.   - continue albuterol nebs/MDI PRN  - Trelegy inhaler daily  - oxygen 2-3L " continuous     Smoker  Continues to smoke in TCU, has had multiple discussions about cessation with provider and   - nicotine (NICODERM CQ) 7 MG/24HR 24 hr patch; Place 1 patch onto the skin daily as needed for nicotine withdrawal symptoms    Yeast infection of the vagina  UC negative, alston report vaginal discharge, itching  - miconazole (MICATIN) 200 MG vaginal suppository; Place 1 suppository (200 mg) vaginally at bedtime for 3 days    Gastrocutaneous fistula due to gastrostomy tube  Recent cellulitis, ? Source of endocarditis. Continue with drainage at site. Had follow-up with surgery on 3/20, recommended upper GI study,   - follow-up with GI  - continue current cares     Physical deconditioning  Making progress with therapy, able to walk ~100ft today  - PT/OT        Orders:  Monistat 3 at bedtime x 3 days  Nicotine patch daily PRN    Electronically signed by:  TAMMIE Cruz CNP      Sincerely,        TAMMIE Cruz CNP

## 2024-03-26 ENCOUNTER — TELEPHONE (OUTPATIENT)
Dept: GERIATRICS | Facility: CLINIC | Age: 69
End: 2024-03-26

## 2024-03-27 ENCOUNTER — LAB REQUISITION (OUTPATIENT)
Dept: LAB | Facility: CLINIC | Age: 69
End: 2024-03-27

## 2024-03-27 DIAGNOSIS — R78.81 BACTEREMIA: ICD-10-CM

## 2024-03-27 NOTE — PROGRESS NOTES
Nurse is calling to report that the patient has a new onset of right lower extremity swelling, different from last week when Dr. Patel evaluated and ordered an ultrasound.  At the time Doppler was negative for DVT.  The swelling is now on the lateral side of the knee behind the knee.  Patient has had history of surgery, she is currently on Rocephin and Vanco IV.  The onset was earlier today.  She just mentioned it to the nurse this aft tiana.  Given that she just had the ultrasound and it was negative last week, as well as currently receiving multiple antibiotics I have a low suspicion for cellulitis or joint infection.  Gave orders for nursing to apply ice as needed, utilize Tylenol and oxycodone as needed and update primary NP right away in the morning for further orders.  Monitor vital signs, if spikes a fever or has any acute changes can call back.  Ksenia Ca on, CNP

## 2024-03-28 ENCOUNTER — TRANSITIONAL CARE UNIT VISIT (OUTPATIENT)
Dept: GERIATRICS | Facility: CLINIC | Age: 69
End: 2024-03-28
Payer: COMMERCIAL

## 2024-03-28 VITALS
DIASTOLIC BLOOD PRESSURE: 80 MMHG | BODY MASS INDEX: 35.97 KG/M2 | HEIGHT: 67 IN | WEIGHT: 229.2 LBS | TEMPERATURE: 97.9 F | OXYGEN SATURATION: 95 % | SYSTOLIC BLOOD PRESSURE: 177 MMHG | RESPIRATION RATE: 18 BRPM | HEART RATE: 90 BPM

## 2024-03-28 DIAGNOSIS — M79.89 PAIN AND SWELLING OF RIGHT LOWER EXTREMITY: Primary | ICD-10-CM

## 2024-03-28 DIAGNOSIS — M79.604 PAIN AND SWELLING OF RIGHT LOWER EXTREMITY: Primary | ICD-10-CM

## 2024-03-28 LAB
CREAT SERPL-MCNC: 0.86 MG/DL (ref 0.51–0.95)
EGFRCR SERPLBLD CKD-EPI 2021: 74 ML/MIN/1.73M2
VANCOMYCIN SERPL-MCNC: 13.6 UG/ML

## 2024-03-28 PROCEDURE — 99308 SBSQ NF CARE LOW MDM 20: CPT | Performed by: NURSE PRACTITIONER

## 2024-03-28 PROCEDURE — 82565 ASSAY OF CREATININE: CPT | Performed by: NURSE PRACTITIONER

## 2024-03-28 PROCEDURE — 36415 COLL VENOUS BLD VENIPUNCTURE: CPT | Performed by: NURSE PRACTITIONER

## 2024-03-28 PROCEDURE — 80202 ASSAY OF VANCOMYCIN: CPT | Performed by: NURSE PRACTITIONER

## 2024-03-28 NOTE — LETTER
3/28/2024        RE: Elly Corrales  [address]  [facility] [address]        [facility] GERIATRICS  ACUTE/EPISODIC VISIT    [facility] Medical Record Number: [id number]  Place of Service where encounter took place: [facility] [place] ([facility]) [[id number]]    Chief Complaint   Patient presents with     RECHECK     HPI:    Elly Corrales is a 68 year old (1955), who is being seen today for an episodic care visit. HPI information obtained from: facility chart records, facility staff, patient report, and [facility] Epic chart review.    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 She continues on IV abx. Seen today as she reports swelling and tenderness in right thigh. Reports started about 2-3 days ago. Did have similar incident about 2 weeks ago, negative for DVT at that time. She is able to walk on it, but it is a little sore. Breathing feels at baseline. She does continue to smoke. She has multiple scars on legs from previous angiograms and other procedures.     ALLERGIES:   Allergies   Allergen Reactions     Cephalexin Shortness Of Breath and Rash     And chest pain     Gabapentin Dizziness     Increased sedation even at 100 mg TID     Penicillin G Hives and Rash     Penicillins Hives and Rash      MEDICATIONS:  Post Discharge Medication Reconciliation Status: medication reconcilation previously completed during another office visit.     Current Outpatient Medications   Medication Sig Dispense Refill     acetaminophen (TYLENOL) 500 MG tablet Take 1,000 mg by mouth 4 times daily       albuterol (PROAIR HFA/PROVENTIL HFA/VENTOLIN HFA) 108 (90 Base) MCG/ACT inhaler Inhale 2 puffs into the lungs every 4 hours as needed for shortness of breath, wheezing or cough       albuterol (PROVENTIL) (2.5 MG/3ML) 0.083% neb solution Take 2.5 mg by nebulization 4 times daily as needed  for shortness of breath, wheezing or cough       alteplase (CATHFLO ACTIVASE) 2 MG injection Inject 2 mg into the vein as needed       aspirin (ASA) 81 MG chewable tablet Take 81 mg by mouth daily       atorvastatin (LIPITOR) 40 MG tablet Take 40 mg by mouth daily       Calcium Carbonate Antacid 1177 MG CHEW Take 1 tablet by mouth 3 times daily       carvedilol (COREG) 25 MG tablet Take 25 mg by mouth 2 times daily (with meals)       cefTRIAXone (ROCEPHIN) 2 GM vial Inject 2 g into the vein every 12 hours For 28 days then discontinue       colchicine (COLCRYS) 0.6 MG tablet Take 0.6 mg by mouth 2 times daily       cyanocobalamin (VITAMIN B-12) 1000 MCG sublingual tablet Place 1 tablet under the tongue daily       DULoxetine (CYMBALTA) 60 MG capsule Take 60 mg by mouth daily       fluticasone (FLONASE) 50 MCG/ACT nasal spray Spray 1 spray into both nostrils daily as needed for rhinitis or allergies       Fluticasone-Umeclidin-Vilanterol (TRELEGY ELLIPTA) 200-62.5-25 MCG/ACT oral inhaler Inhale 1 puff into the lungs daily       furosemide (LASIX) 40 MG tablet Take 40 mg by mouth daily       guaiFENesin (MUCINEX) 600 MG 12 hr tablet Take 1,200 mg by mouth 2 times daily       hydrOXYzine deanna (VISTARIL) 25 MG capsule Take 25 mg by mouth 3 times daily as needed for itching       ipratropium - albuterol 0.5 mg/2.5 mg/3 mL (DUONEB) 0.5-2.5 (3) MG/3ML neb solution Take 1 vial by nebulization 4 times daily       levothyroxine (SYNTHROID/LEVOTHROID) 150 MCG tablet Take 150 mcg by mouth daily       lidocaine (LIDODERM) 5 % patch Place onto the skin every 24 hours To prevent lidocaine toxicity, patient should be patch free for 12 hrs daily.       loratadine (CLARITIN) 10 MG tablet Take 10 mg by mouth daily       losartan (COZAAR) 50 MG tablet Take 50 mg by mouth daily       magnesium oxide 400 MG tablet Take 400 mg by mouth daily       meclizine (ANTIVERT) 12.5 MG tablet Take 12.5 mg by mouth every morning And 12.5mg Po BID PRN        melatonin 3 MG tablet Take 3 mg by mouth nightly as needed for sleep       miconazole (MICATIN) 200 MG vaginal suppository Place 1 suppository (200 mg) vaginally at bedtime for 3 days       multivitamin childrens (ANIMAL SHAPES) CHEW chewable tablet Take 2 tablets by mouth daily       nicotine (NICODERM CQ) 7 MG/24HR 24 hr patch Place 1 patch onto the skin daily as needed for nicotine withdrawal symptoms       ondansetron (ZOFRAN ODT) 8 MG ODT tab Take 8 mg by mouth every 8 hours as needed for nausea       oxyCODONE (ROXICODONE) 5 MG tablet Take 1-2 tablets (5-10 mg) by mouth every 6 hours as needed for severe pain 30 tablet 0     pantoprazole (PROTONIX) 40 MG EC tablet Take 40 mg by mouth 2 times daily       polyethylene glycol 3350 POWD Take 17 g by mouth daily as needed (constipation)       potassium chloride jaqui ER (KLOR-CON M20) 20 MEQ CR tablet Take 20 mEq by mouth daily       predniSONE (DELTASONE) 5 MG tablet Take 5 mg by mouth daily       propylene glycol (SYSTANE BALANCE) 0.6 % SOLN ophthalmic solution Place 1 drop into both eyes 4 times daily as needed for dry eyes       senna-docusate (SENOKOT-S/PERICOLACE) 8.6-50 MG tablet Take 2 tablets by mouth 2 times daily       simethicone (MYLICON) 125 MG chewable tablet Take 125 mg by mouth every 6 hours as needed for intestinal gas       Vancomycin HCl 1250 MG/250ML SOLN Inject 1,250 mg into the vein daily For 27 days then discontinue       Vitamin D3 (CHOLECALCIFEROL) 25 mcg (1000 units) tablet Take 1 tablet by mouth daily       Medications reviewed:  Medications reconciled to facility chart and changes were made to reflect current medications as identified as above med list. Below are the changes that were made:   Medications stopped since last EPIC medication reconciliation:   There are no discontinued medications.    Medications started since last [facility] medication reconciliation:  No orders of the defined types were placed in this  "encounter.        REVIEW OF SYSTEMS:  4 point ROS neg other than the symptoms noted above in the HPI.      PHYSICAL EXAM:  BP (!) 177/80   Pulse 90   Temp 97.9  F (36.6  C)   Resp 18   Ht 1.702 m (5' 7\")   Wt 104 kg (229 lb 3.2 oz)   SpO2 95%   BMI 35.90 kg/m    Physical Exam  Cardiovascular:      Rate and Rhythm: Regular rhythm.      Heart sounds: Normal heart sounds.   Pulmonary:      Effort: Pulmonary effort is normal.      Breath sounds: Normal breath sounds.   Musculoskeletal:         General: Tenderness (right thigh) present.      Right lower leg: Edema present.   Neurological:      Mental Status: She is alert.   Psychiatric:         Mood and Affect: Mood normal.         Thought Content: Thought content normal.         ASSESSMENT / PLAN:  Pain and swelling of right lower extremity  Swelling, some tenderness in right thigh/calf. No erythema or excessive warmth. Mobility is improved, but is a smoker   - check RLE venous duplex to r/o DVT      Orders:  RLE venous duplex    Electronically signed by:  TAMMIE Cruz CNP      Sincerely,        TAMMIE Cruz CNP      "

## 2024-03-31 ENCOUNTER — LAB REQUISITION (OUTPATIENT)
Dept: LAB | Facility: CLINIC | Age: 69
End: 2024-03-31

## 2024-03-31 DIAGNOSIS — R78.81 BACTEREMIA: ICD-10-CM

## 2024-04-01 DIAGNOSIS — M25.511 CHRONIC RIGHT SHOULDER PAIN: ICD-10-CM

## 2024-04-01 DIAGNOSIS — G89.29 CHRONIC RIGHT SHOULDER PAIN: ICD-10-CM

## 2024-04-01 LAB
BASOPHILS # BLD AUTO: 0.1 10E3/UL (ref 0–0.2)
BASOPHILS NFR BLD AUTO: 2 %
CREAT SERPL-MCNC: 0.89 MG/DL (ref 0.51–0.95)
EGFRCR SERPLBLD CKD-EPI 2021: 71 ML/MIN/1.73M2
EOSINOPHIL # BLD AUTO: 0.2 10E3/UL (ref 0–0.7)
EOSINOPHIL NFR BLD AUTO: 3 %
IMM GRANULOCYTES # BLD: 0 10E3/UL
IMM GRANULOCYTES NFR BLD: 0 %
LYMPHOCYTES # BLD AUTO: 1.5 10E3/UL (ref 0.8–5.3)
LYMPHOCYTES NFR BLD AUTO: 19 %
MONOCYTES # BLD AUTO: 1.1 10E3/UL (ref 0–1.3)
MONOCYTES NFR BLD AUTO: 15 %
NEUTROPHILS # BLD AUTO: 4.7 10E3/UL (ref 1.6–8.3)
NEUTROPHILS NFR BLD AUTO: 61 %
NRBC # BLD AUTO: 0 10E3/UL
NRBC BLD AUTO-RTO: 0 /100
VANCOMYCIN SERPL-MCNC: 15.2 UG/ML
WBC # BLD AUTO: 7.8 10E3/UL (ref 4–11)

## 2024-04-01 PROCEDURE — 85048 AUTOMATED LEUKOCYTE COUNT: CPT | Performed by: NURSE PRACTITIONER

## 2024-04-01 PROCEDURE — P9603 ONE-WAY ALLOW PRORATED MILES: HCPCS | Performed by: NURSE PRACTITIONER

## 2024-04-01 PROCEDURE — 80202 ASSAY OF VANCOMYCIN: CPT | Performed by: NURSE PRACTITIONER

## 2024-04-01 PROCEDURE — 82565 ASSAY OF CREATININE: CPT | Performed by: NURSE PRACTITIONER

## 2024-04-01 PROCEDURE — 36415 COLL VENOUS BLD VENIPUNCTURE: CPT | Performed by: NURSE PRACTITIONER

## 2024-04-01 RX ORDER — OXYCODONE HYDROCHLORIDE 5 MG/1
5-10 TABLET ORAL EVERY 6 HOURS PRN
Qty: 30 TABLET | Refills: 0 | Status: SHIPPED | OUTPATIENT
Start: 2024-04-01

## 2024-04-03 NOTE — PROGRESS NOTES
[facility] GERIATRICS DISCHARGE SUMMARY  Patient Name: Elly Corrales  YOB: 1955  [facility] Medical Record Number: [id number]  Place of Service Where Encounter Took Place: [facility] ([facility]) [[id number]]    PRIMARY CARE PROVIDER AND CLINIC RESPONSIBLE AFTER TRANSFER: Bert Mccoy, [address] [facility] [address]; Non-FMG Provider     Transferring providers: TAMMIE Rahman CNP; Bozena Patel MD  Recent Hospitalization/ED: [facility]  stay 2/14/24 to 3/7/24.  Date of SNF Admission: March 07, 2024  Date of SNF (anticipated) Discharge: April 04, 2024  Discharged to: previous independent home  Cognitive Scores: BIMS: 14/15  Physical Function: Ambulating 75 ft with SBA  DME: Wheelchair and Walker    CODE STATUS/ADVANCE DIRECTIVES DISCUSSION: Full Code   ALLERGIES: Cephalexin, Gabapentin, Penicillin g, and Penicillins    NURSING FACILITY COURSE:      Summary of nursing facility stay:   Brief Hospital Course: PMH of severe aortic stenosis S/P aortic valve replacement, mild coronary artery disease, HTN, chronic pain with opioid dependence, TRE, obesity S/P gastric sleeve, and hypothyroidism with recent hospitalization for PEG tube site infection; developed cellulitis and sepsis. Blood cultures grew enterococcus. 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 Was transferred to [facility]. ID continued vancomycin, started ceftriaxone and flagyl as had diverticulosis noted on CT. Underwent aortic root replacement Bentall procedure and homograph on 2/22. Did have significant bleeding, hypoxia requiring high vent settings and acute renal failure requiring Lasix gtt. Renal function improved, weaned off vent but continue to require supplemental oxygen. Had CT that showed LLL atelectasis, progressed septic emboli in right lung. Had episode of NSVT on 3/7, Echo showed pericardial effusion. Was  started on colchicine x 3 months. ID recommended IV ceftriaxone and vancomycin x 28 days. Was started on dronabinol for poor appetite. When medically stable was discharged to TCU for further rehab and medical management.     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 New on colchicine for pericardial effusion. Was due to complete IV abx today but she requested this be stopped 1 day early so she can get EGD procedure done 4/5 to attempt to close G-tube site without waiver closing. . PICC line removed this AM. Labs stable in TCU.   - colchicine (COLCRYS) 0.6 MG tablet; Take 1 tablet (0.6 mg) by mouth 2 times daily  - sternal precautions  - analgesia with APAP 1000mg QID, oxycodoone 5-10mg PRN  - follow-up with CV surgery  - resume home PCA cares.      Coronary artery disease involving native coronary artery of native heart without angina pectoris  Primary hypertension  Hyperlipidemia, unspecified hyperlipidemia type  CAD mild per history. No recent chest pain. -160, HR 80-90. Weight stable ~230lbs.   - daily weights  - continue losartan  - follow-up with cardiology  - monitor and adjust   - aspirin (ASA) 81 MG chewable tablet; Take 1 tablet (81 mg) by mouth daily  - carvedilol (COREG) 25 MG tablet; Take 1 tablet (25 mg) by mouth 2 times daily (with meals)  - furosemide (LASIX) 40 MG tablet; Take 1 tablet (40 mg) by mouth daily        COPD, moderate (H)  Chronic respiratory failure with hypoxia and hypercapnia (H)  Appears compensated. does take oxygen off when she goes off to smoke, otherwise wears 2L. Maintains sats with activity. Denies any wheezing.   - continue albuterol nebs/MDI PRN  - Trelegy inhaler daily, nebs/MDI PRN  - oxygen 2-3L continuous   - guaiFENesin (MUCINEX) 600 MG 12 hr tablet; Take 2 tablets (1,200 mg) by  mouth 2 times daily  - predniSONE (DELTASONE) 5 MG tablet; Take 1 tablet (5 mg) by mouth daily      Smoker  Daily smoker in TCU. States she will quit when she goes home  - nicotine patch    Chronic constipation  Miralax PRN  - senna-docusate (SENOKOT-S/PERICOLACE) 8.6-50 MG tablet; Take 2 tablets by mouth 2 times daily    Gastroesophageal reflux disease without esophagitis  - pantoprazole (PROTONIX) 40 MG EC tablet; Take 1 tablet (40 mg) by mouth 2 times daily    Gastrocutaneous fistula due to gastrostomy tube  Recent cellulitis, ? Source of endocarditis. Continue with drainage at site. Had follow-up with surgery on 3/20, recommended upper GI study. Continues with some bile leakage from site.  - EGD on 4/5   - continue current wound are     Blood loss anemia  Hgb stable     Depression, recurrent (H24)  - DULoxetine (CYMBALTA) 60 MG capsule; Take 1 capsule (60 mg) by mouth daily    Hypothyroidism, unspecified type  TSH 1.16  - continue levothyroxine 150mcg daily    Allergic rhinitis, unspecified seasonality, unspecified trigger  - flonase PRN  - loratadine (CLARITIN) 10 MG tablet; Take 1 tablet (10 mg) by mouth daily    Vertigo  - meclizine (ANTIVERT) 12.5 MG tablet; Take 1 tablet (12.5 mg) by mouth every morning And 12.5mg Po BID PRN    Insomnia, unspecified type  - melatonin 3 MG tablet; Take 1 tablet (3 mg) by mouth nightly as needed for sleep    Nausea  - ondansetron (ZOFRAN ODT) 8 MG ODT tab; Take 1 tablet (8 mg) by mouth every 8 hours as needed for nausea    Chronic right-sided low back pain with sciatica, sciatica laterality unspecified  Limited mobility, pain controlled  - Lidocaine (LIDOCARE) 4 % Patch; Place 1 patch onto the skin every 24 hours To prevent lidocaine toxicity, patient should be patch free for 12 hrs daily.  - acetaminophen (TYLENOL) 500 MG tablet; Take 2 tablets (1,000 mg) by mouth 4 times daily  - oxycodone PRN    Discharge Medications:  MED REC REQUIRED  Post Medication Reconciliation  Status: medication reconcilation previously completed during another office visit    Current Outpatient Medications   Medication Sig Dispense Refill    acetaminophen (TYLENOL) 500 MG tablet Take 2 tablets (1,000 mg) by mouth 4 times daily 120 tablet 0    aspirin (ASA) 81 MG chewable tablet Take 1 tablet (81 mg) by mouth daily 30 tablet 0    carvedilol (COREG) 25 MG tablet Take 1 tablet (25 mg) by mouth 2 times daily (with meals) 60 tablet 0    colchicine (COLCRYS) 0.6 MG tablet Take 1 tablet (0.6 mg) by mouth 2 times daily 60 tablet 0    DULoxetine (CYMBALTA) 60 MG capsule Take 1 capsule (60 mg) by mouth daily 30 capsule 0    furosemide (LASIX) 40 MG tablet Take 1 tablet (40 mg) by mouth daily 30 tablet 0    guaiFENesin (MUCINEX) 600 MG 12 hr tablet Take 2 tablets (1,200 mg) by mouth 2 times daily 120 tablet 0    Lidocaine (LIDOCARE) 4 % Patch Place 1 patch onto the skin every 24 hours To prevent lidocaine toxicity, patient should be patch free for 12 hrs daily. 30 patch 0    loratadine (CLARITIN) 10 MG tablet Take 1 tablet (10 mg) by mouth daily 30 tablet 0    meclizine (ANTIVERT) 12.5 MG tablet Take 1 tablet (12.5 mg) by mouth every morning And 12.5mg Po BID PRN 45 tablet 0    melatonin 3 MG tablet Take 1 tablet (3 mg) by mouth nightly as needed for sleep 30 tablet 0    ondansetron (ZOFRAN ODT) 8 MG ODT tab Take 1 tablet (8 mg) by mouth every 8 hours as needed for nausea 30 tablet 0    pantoprazole (PROTONIX) 40 MG EC tablet Take 1 tablet (40 mg) by mouth 2 times daily 60 tablet 0    predniSONE (DELTASONE) 5 MG tablet Take 1 tablet (5 mg) by mouth daily 30 tablet 0    senna-docusate (SENOKOT-S/PERICOLACE) 8.6-50 MG tablet Take 2 tablets by mouth 2 times daily 120 tablet 0    albuterol (PROAIR HFA/PROVENTIL HFA/VENTOLIN HFA) 108 (90 Base) MCG/ACT inhaler Inhale 2 puffs into the lungs every 4 hours as needed for shortness of breath, wheezing or cough      albuterol (PROVENTIL) (2.5 MG/3ML) 0.083% neb solution Take  2.5 mg by nebulization 4 times daily as needed for shortness of breath, wheezing or cough      alteplase (CATHFLO ACTIVASE) 2 MG injection Inject 2 mg into the vein as needed      atorvastatin (LIPITOR) 40 MG tablet Take 40 mg by mouth daily      Calcium Carbonate Antacid 1177 MG CHEW Take 1 tablet by mouth 3 times daily      cefTRIAXone (ROCEPHIN) 2 GM vial Inject 2 g into the vein every 12 hours For 28 days then discontinue      cyanocobalamin (VITAMIN B-12) 1000 MCG sublingual tablet Place 1 tablet under the tongue daily      fluticasone (FLONASE) 50 MCG/ACT nasal spray Spray 1 spray into both nostrils daily as needed for rhinitis or allergies      Fluticasone-Umeclidin-Vilanterol (TRELEGY ELLIPTA) 200-62.5-25 MCG/ACT oral inhaler Inhale 1 puff into the lungs daily      hydrOXYzine deanna (VISTARIL) 25 MG capsule Take 25 mg by mouth 3 times daily as needed for itching      ipratropium - albuterol 0.5 mg/2.5 mg/3 mL (DUONEB) 0.5-2.5 (3) MG/3ML neb solution Take 1 vial by nebulization 4 times daily      levothyroxine (SYNTHROID/LEVOTHROID) 150 MCG tablet Take 150 mcg by mouth daily      losartan (COZAAR) 50 MG tablet Take 50 mg by mouth daily      magnesium oxide 400 MG tablet Take 400 mg by mouth daily      multivitamin childrens (ANIMAL SHAPES) CHEW chewable tablet Take 2 tablets by mouth daily      nicotine (NICODERM CQ) 7 MG/24HR 24 hr patch Place 1 patch onto the skin daily as needed for nicotine withdrawal symptoms      oxyCODONE (ROXICODONE) 5 MG tablet Take 1-2 tablets (5-10 mg) by mouth every 6 hours as needed for severe pain 30 tablet 0    polyethylene glycol 3350 POWD Take 17 g by mouth daily as needed (constipation)      potassium chloride jaqui ER (KLOR-CON M20) 20 MEQ CR tablet Take 20 mEq by mouth daily      propylene glycol (SYSTANE BALANCE) 0.6 % SOLN ophthalmic solution Place 1 drop into both eyes 4 times daily as needed for dry eyes      simethicone (MYLICON) 125 MG chewable tablet Take 125 mg by  mouth every 6 hours as needed for intestinal gas      Vancomycin HCl 1250 MG/250ML SOLN Inject 1,250 mg into the vein daily For 27 days then discontinue      Vitamin D3 (CHOLECALCIFEROL) 25 mcg (1000 units) tablet Take 1 tablet by mouth daily       Controlled medications:   Medication: oxycodone , 23 tabs given to patient at the time of discharge to take home     Past Medical History: No past medical history on file.  Physical Exam:   Vitals: There were no vitals taken for this visit.  BMI: There is no height or weight on file to calculate BMI.  GENERAL APPEARANCE:  Alert, in no distress, cooperative,   RESP:  lungs clear to auscultation , no respiratory distress   CV:  regular rate and rhythm, no murmur, rub, or gallop, no edema  M/S:   no gross joint deformities   NEURO:   Cn 2-12 grossly intact,   PSYCH:  oriented X 3, affect and mood        SNF Labs: Recent labs in [facility] reviewed by me today.  and Most Recent 3 CBC's:  Recent Labs   Lab Test 04/01/24 0700 03/25/24 0746 03/18/24 0700 03/11/24 0700 01/29/19  1153   WBC 7.8 7.3 7.2   < > 11.1*   HGB  --   --   --   --  13.7   MCV  --   --   --   --  96   PLT  --   --   --   --  262    < > = values in this interval not displayed.     Most Recent 3 BMP's:  Recent Labs   Lab Test 04/01/24 0700 03/28/24  0630 03/25/24 0746 03/11/24 0700 01/29/19  1153   NA  --   --   --   --  139   POTASSIUM  --   --   --   --  3.8   CHLORIDE  --   --   --   --  105   CO2  --   --   --   --  29   BUN  --   --   --   --  10   CR 0.89 0.86 0.90   < > 0.69   ANIONGAP  --   --   --   --  5   PRITI  --   --   --   --  9.1   GLC  --   --   --   --  129*    < > = values in this interval not displayed.     Most Recent 2 LFT's:No lab results found.    DISCHARGE PLAN:  Follow up labs: No labs orders/due  Medical Follow Up:   Follow up with primary care provider in 2 weeks  [facility] scheduled appointments: None.   Discharge Services: Nurse and PCA though Chelsey CLARKE  Discharge Instructions  Verbalized to Patient at Discharge:   Weigh yourself daily in the morning and keep a record. Call your primary clinic: a) if you are more short of breath, or b) if your weight changes more than 3 pounds in one day or more than 5 pounds in one week.   Wound care: see discharge orders .   DO NOT DRIVE while taking narcotic pain medications.     TOTAL DISCHARGE TIME: Greater than 30 minutes  Electronically signed by:  TAMMIE Cruz CNP    Documentation of Face to Face and Certification for Home Health Services    I certify that services are/were furnished while this patient was under the care of a physician and that a physician or an allowed non-physician practitioner (NPP), had a face-to-face encounter that meets the physician face-to-face encounter requirements. The encounter was in whole, or in part, related to the primary reason for home health. The patient is confined to his/her home and needs intermittent skilled nursing, physical therapy, speech-language pathology, or the continued need for occupational therapy. A plan of care has been established by a physician and is periodically reviewed by a physician.  Date of Face-to-Face Encounter: 4/4/2024.    I certify that, based on my findings, the following services are medically necessary home health services: Nursing and PCA .    My clinical findings support the need for the above skilled services because: Requires assistance of another person or specialized equipment to access medical services because patient: Is unable to walk greater than 75 feet without rest. and  gets SOB with acitivty..    Patient to re-establish plan of care with their PCP within 7-10 days after leaving the facility to reestablish care.  Medicare certified PECOS provider: TAMMIE Cruz CNP  Date: April 4, 2024    Dr.Yasser Amanda MD, signing F2F and only signing for initial order. Please send all follow up questions and concerns or needed follow up signatures to the  "PCP, who [facility] has on file as:  Bert Mccoy.        MEDICAL NECESSITY STATEMENT FOR DME    Demographic Information on Elly Corrales:    Elly Corrales  Gender: female  : 1955  [address]  [place] [address]  [phone] (home)     Medical Record: [id number]  Social Security Number: [id number]  Primary Care Provider: Bert Mccoy  Insurance: Payor: [facility] / Plan: [facility] DUAL / Product Type: HMO /       Bacterial endocarditis, unspecified chronicity [I33.0]    DME:  VITAL SIGNS:  Vitals: BP (!) 177/82   Pulse 90   Temp 97.8  F (36.6  C)   Resp 18   Ht 1.702 m (5' 7\")   Wt 103 kg (227 lb)   SpO2 93%   BMI 35.55 kg/m    BMI= Body mass index is 35.55 kg/m .     MEDICAL NECESSITY STATEMENT (describe reason to support DME here including length of need)  Bacterial endocarditis, unspecified chronicity [I33.0]    Due to COPD, low back back with sciatica patient has the following limitations of mobility: inability to participate in MRADL's, cannot walk distance with assist device such as cane or walker. She needs electric heavy duty scooter to improve mobility and quality of life. Due to COPD/SOB she fatigues easily and is unable to self propel in manual wheelchair for long distances. Given her complicated medical history she needs to frequently physically attend appointments . She will use this scooter lifetime need and it will improve participation in MRADL's and quality of life.       ELECTRONICALLY SIGNED BY MEÑO CERTIFIED PROVIDER:  TAMMIE Cruz CNP   NPI: [id number]  [facility]  [address] [place] [address]        "

## 2024-04-04 ENCOUNTER — DISCHARGE SUMMARY NURSING HOME (OUTPATIENT)
Dept: GERIATRICS | Facility: CLINIC | Age: 69
End: 2024-04-04
Payer: COMMERCIAL

## 2024-04-04 VITALS
WEIGHT: 227 LBS | DIASTOLIC BLOOD PRESSURE: 82 MMHG | HEART RATE: 90 BPM | SYSTOLIC BLOOD PRESSURE: 177 MMHG | RESPIRATION RATE: 18 BRPM | BODY MASS INDEX: 35.63 KG/M2 | HEIGHT: 67 IN | TEMPERATURE: 97.8 F | OXYGEN SATURATION: 93 %

## 2024-04-04 DIAGNOSIS — R11.0 NAUSEA: ICD-10-CM

## 2024-04-04 DIAGNOSIS — G89.29 CHRONIC RIGHT-SIDED LOW BACK PAIN WITH SCIATICA, SCIATICA LATERALITY UNSPECIFIED: ICD-10-CM

## 2024-04-04 DIAGNOSIS — I25.10 CORONARY ARTERY DISEASE INVOLVING NATIVE CORONARY ARTERY OF NATIVE HEART WITHOUT ANGINA PECTORIS: ICD-10-CM

## 2024-04-04 DIAGNOSIS — D50.0 BLOOD LOSS ANEMIA: ICD-10-CM

## 2024-04-04 DIAGNOSIS — T82.6XXD PROSTHETIC VALVE ENDOCARDITIS, SUBSEQUENT ENCOUNTER: ICD-10-CM

## 2024-04-04 DIAGNOSIS — J96.11 CHRONIC RESPIRATORY FAILURE WITH HYPOXIA AND HYPERCAPNIA (H): ICD-10-CM

## 2024-04-04 DIAGNOSIS — G47.00 INSOMNIA, UNSPECIFIED TYPE: ICD-10-CM

## 2024-04-04 DIAGNOSIS — J30.9 ALLERGIC RHINITIS, UNSPECIFIED SEASONALITY, UNSPECIFIED TRIGGER: ICD-10-CM

## 2024-04-04 DIAGNOSIS — J96.12 CHRONIC RESPIRATORY FAILURE WITH HYPOXIA AND HYPERCAPNIA (H): ICD-10-CM

## 2024-04-04 DIAGNOSIS — F17.200 SMOKER: ICD-10-CM

## 2024-04-04 DIAGNOSIS — K21.9 GASTROESOPHAGEAL REFLUX DISEASE WITHOUT ESOPHAGITIS: ICD-10-CM

## 2024-04-04 DIAGNOSIS — F33.9 DEPRESSION, RECURRENT (H): ICD-10-CM

## 2024-04-04 DIAGNOSIS — K59.09 CHRONIC CONSTIPATION: ICD-10-CM

## 2024-04-04 DIAGNOSIS — R42 VERTIGO: ICD-10-CM

## 2024-04-04 DIAGNOSIS — I33.0 ABSCESS OF AORTIC ROOT: ICD-10-CM

## 2024-04-04 DIAGNOSIS — I38 PROSTHETIC VALVE ENDOCARDITIS, SUBSEQUENT ENCOUNTER: ICD-10-CM

## 2024-04-04 DIAGNOSIS — Z48.812 AFTERCARE FOLLOWING SURGERY OF THE CIRCULATORY SYSTEM, NEC: ICD-10-CM

## 2024-04-04 DIAGNOSIS — I33.0 BACTERIAL ENDOCARDITIS, UNSPECIFIED CHRONICITY: Primary | ICD-10-CM

## 2024-04-04 DIAGNOSIS — E78.5 HYPERLIPIDEMIA, UNSPECIFIED HYPERLIPIDEMIA TYPE: ICD-10-CM

## 2024-04-04 DIAGNOSIS — K31.6 GASTROCUTANEOUS FISTULA DUE TO GASTROSTOMY TUBE: ICD-10-CM

## 2024-04-04 DIAGNOSIS — M54.40 CHRONIC RIGHT-SIDED LOW BACK PAIN WITH SCIATICA, SCIATICA LATERALITY UNSPECIFIED: ICD-10-CM

## 2024-04-04 DIAGNOSIS — E03.9 HYPOTHYROIDISM, UNSPECIFIED TYPE: ICD-10-CM

## 2024-04-04 DIAGNOSIS — J44.9 COPD, MODERATE (H): ICD-10-CM

## 2024-04-04 DIAGNOSIS — I10 PRIMARY HYPERTENSION: ICD-10-CM

## 2024-04-04 DIAGNOSIS — I31.39 PERICARDIAL EFFUSION: ICD-10-CM

## 2024-04-04 PROCEDURE — 99316 NF DSCHRG MGMT 30 MIN+: CPT | Performed by: NURSE PRACTITIONER

## 2024-04-04 RX ORDER — AMOXICILLIN 250 MG
2 CAPSULE ORAL 2 TIMES DAILY
Qty: 120 TABLET | Refills: 0 | Status: SHIPPED | OUTPATIENT
Start: 2024-04-04

## 2024-04-04 RX ORDER — FUROSEMIDE 40 MG
40 TABLET ORAL DAILY
Qty: 30 TABLET | Refills: 0 | Status: SHIPPED | OUTPATIENT
Start: 2024-04-04

## 2024-04-04 RX ORDER — ACETAMINOPHEN 500 MG
1000 TABLET ORAL 4 TIMES DAILY
Qty: 120 TABLET | Refills: 0 | Status: SHIPPED | OUTPATIENT
Start: 2024-04-04

## 2024-04-04 RX ORDER — PANTOPRAZOLE SODIUM 40 MG/1
40 TABLET, DELAYED RELEASE ORAL 2 TIMES DAILY
Qty: 60 TABLET | Refills: 0 | Status: SHIPPED | OUTPATIENT
Start: 2024-04-04

## 2024-04-04 RX ORDER — PREDNISONE 5 MG/1
5 TABLET ORAL DAILY
Qty: 30 TABLET | Refills: 0 | Status: SHIPPED | OUTPATIENT
Start: 2024-04-04

## 2024-04-04 RX ORDER — LANOLIN ALCOHOL/MO/W.PET/CERES
3 CREAM (GRAM) TOPICAL
Qty: 30 TABLET | Refills: 0 | Status: SHIPPED | OUTPATIENT
Start: 2024-04-04

## 2024-04-04 RX ORDER — LIDOCAINE 4 G/G
1 PATCH TOPICAL EVERY 24 HOURS
Qty: 30 PATCH | Refills: 0 | Status: SHIPPED | OUTPATIENT
Start: 2024-04-04

## 2024-04-04 RX ORDER — GUAIFENESIN 600 MG/1
1200 TABLET, EXTENDED RELEASE ORAL 2 TIMES DAILY
Qty: 120 TABLET | Refills: 0 | Status: SHIPPED | OUTPATIENT
Start: 2024-04-04

## 2024-04-04 RX ORDER — CARVEDILOL 25 MG/1
25 TABLET ORAL 2 TIMES DAILY WITH MEALS
Qty: 60 TABLET | Refills: 0 | Status: SHIPPED | OUTPATIENT
Start: 2024-04-04

## 2024-04-04 RX ORDER — DULOXETIN HYDROCHLORIDE 60 MG/1
60 CAPSULE, DELAYED RELEASE ORAL DAILY
Qty: 30 CAPSULE | Refills: 0 | Status: SHIPPED | OUTPATIENT
Start: 2024-04-04

## 2024-04-04 RX ORDER — COLCHICINE 0.6 MG/1
0.6 TABLET ORAL 2 TIMES DAILY
Qty: 60 TABLET | Refills: 0 | Status: SHIPPED | OUTPATIENT
Start: 2024-04-04

## 2024-04-04 RX ORDER — MECLIZINE HCL 12.5 MG 12.5 MG/1
12.5 TABLET ORAL EVERY MORNING
Qty: 45 TABLET | Refills: 0 | Status: SHIPPED | OUTPATIENT
Start: 2024-04-04

## 2024-04-04 RX ORDER — ONDANSETRON 8 MG/1
8 TABLET, ORALLY DISINTEGRATING ORAL EVERY 8 HOURS PRN
Qty: 30 TABLET | Refills: 0 | Status: SHIPPED | OUTPATIENT
Start: 2024-04-04

## 2024-04-04 RX ORDER — ASPIRIN 81 MG/1
81 TABLET, CHEWABLE ORAL DAILY
Qty: 30 TABLET | Refills: 0 | Status: SHIPPED | OUTPATIENT
Start: 2024-04-04

## 2024-04-04 RX ORDER — LORATADINE 10 MG/1
10 TABLET ORAL DAILY
Qty: 30 TABLET | Refills: 0 | Status: SHIPPED | OUTPATIENT
Start: 2024-04-04

## 2024-04-04 NOTE — LETTER
4/4/2024        RE: Elly Corrales  [address]  [facility] [address]        [facility] GERIATRICS DISCHARGE SUMMARY  Patient Name: Elly Corrales  YOB: 1955  [place] Medical Record Number: [id number]  Place of Service Where Encounter Took Place: [facility] (SNF) [[id number]]    PRIMARY CARE PROVIDER AND CLINIC RESPONSIBLE AFTER TRANSFER: Bert Mccoy, [address] [facility] [address]; Non-[facility] Provider     Transferring providers: TAMMIE Rahman CNP; Bozena Patel MD  Recent Hospitalization/ED: [facility]  stay 2/14/24 to 3/7/24.  Date of SNF Admission: March 07, 2024  Date of SNF (anticipated) Discharge: April 04, 2024  Discharged to: previous independent home  Cognitive Scores: BIMS: 14/15  Physical Function: Ambulating 75 ft with SBA  DME: Wheelchair and Walker    CODE STATUS/ADVANCE DIRECTIVES DISCUSSION: Full Code   ALLERGIES: Cephalexin, Gabapentin, Penicillin g, and Penicillins    NURSING FACILITY COURSE:      Summary of nursing facility stay:   Brief Hospital Course: PMH of severe aortic stenosis S/P aortic valve replacement, mild coronary artery disease, HTN, chronic pain with opioid dependence, TRE, obesity S/P gastric sleeve, and hypothyroidism with recent hospitalization for PEG tube site infection; developed cellulitis and sepsis. Blood cultures grew enterococcus. 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 Was transferred to [facility]. ID continued vancomycin, started ceftriaxone and flagyl as had diverticulosis noted on CT. Underwent aortic root replacement Bentall procedure and homograph on 2/22. Did have significant bleeding, hypoxia requiring high vent settings and acute renal failure requiring Lasix gtt. Renal function improved, weaned off vent but continue to require supplemental oxygen. Had CT that showed LLL atelectasis, progressed septic  emboli in right lung. Had episode of NSVT on 3/7, Echo showed pericardial effusion. Was started on colchicine x 3 months. ID recommended IV ceftriaxone and vancomycin x 28 days. Was started on dronabinol for poor appetite. When medically stable was discharged to TCU for further rehab and medical management.     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 New on colchicine for pericardial effusion. Was due to complete IV abx today but she requested this be stopped 1 day early so she can get EGD procedure done 4/5 to attempt to close G-tube site without waiver closing. . PICC line removed this AM. Labs stable in TCU.   - colchicine (COLCRYS) 0.6 MG tablet; Take 1 tablet (0.6 mg) by mouth 2 times daily  - sternal precautions  - analgesia with APAP 1000mg QID, oxycodoone 5-10mg PRN  - follow-up with CV surgery  - resume home PCA cares.      Coronary artery disease involving native coronary artery of native heart without angina pectoris  Primary hypertension  Hyperlipidemia, unspecified hyperlipidemia type  CAD mild per history. No recent chest pain. -160, HR 80-90. Weight stable ~230lbs.   - daily weights  - continue losartan  - follow-up with cardiology  - monitor and adjust   - aspirin (ASA) 81 MG chewable tablet; Take 1 tablet (81 mg) by mouth daily  - carvedilol (COREG) 25 MG tablet; Take 1 tablet (25 mg) by mouth 2 times daily (with meals)  - furosemide (LASIX) 40 MG tablet; Take 1 tablet (40 mg) by mouth daily        COPD, moderate (H)  Chronic respiratory failure with hypoxia and hypercapnia (H)  Appears compensated. does take oxygen off when she goes off to smoke, otherwise wears 2L. Maintains sats with activity. Denies any wheezing.   - continue albuterol nebs/MDI PRN  - Trelegy inhaler daily, nebs/MDI PRN  - oxygen 2-3L  continuous   - guaiFENesin (MUCINEX) 600 MG 12 hr tablet; Take 2 tablets (1,200 mg) by mouth 2 times daily  - predniSONE (DELTASONE) 5 MG tablet; Take 1 tablet (5 mg) by mouth daily      Smoker  Daily smoker in TCU. States she will quit when she goes home  - nicotine patch    Chronic constipation  Miralax PRN  - senna-docusate (SENOKOT-S/PERICOLACE) 8.6-50 MG tablet; Take 2 tablets by mouth 2 times daily    Gastroesophageal reflux disease without esophagitis  - pantoprazole (PROTONIX) 40 MG EC tablet; Take 1 tablet (40 mg) by mouth 2 times daily    Gastrocutaneous fistula due to gastrostomy tube  Recent cellulitis, ? Source of endocarditis. Continue with drainage at site. Had follow-up with surgery on 3/20, recommended upper GI study. Continues with some bile leakage from site.  - EGD on 4/5   - continue current wound are     Blood loss anemia  Hgb stable     Depression, recurrent (H24)  - DULoxetine (CYMBALTA) 60 MG capsule; Take 1 capsule (60 mg) by mouth daily    Hypothyroidism, unspecified type  TSH 1.16  - continue levothyroxine 150mcg daily    Allergic rhinitis, unspecified seasonality, unspecified trigger  - flonase PRN  - loratadine (CLARITIN) 10 MG tablet; Take 1 tablet (10 mg) by mouth daily    Vertigo  - meclizine (ANTIVERT) 12.5 MG tablet; Take 1 tablet (12.5 mg) by mouth every morning And 12.5mg Po BID PRN    Insomnia, unspecified type  - melatonin 3 MG tablet; Take 1 tablet (3 mg) by mouth nightly as needed for sleep    Nausea  - ondansetron (ZOFRAN ODT) 8 MG ODT tab; Take 1 tablet (8 mg) by mouth every 8 hours as needed for nausea    Chronic right-sided low back pain with sciatica, sciatica laterality unspecified  Limited mobility, pain controlled  - Lidocaine (LIDOCARE) 4 % Patch; Place 1 patch onto the skin every 24 hours To prevent lidocaine toxicity, patient should be patch free for 12 hrs daily.  - acetaminophen (TYLENOL) 500 MG tablet; Take 2 tablets (1,000 mg) by mouth 4 times daily  -  oxycodone PRN    Discharge Medications:  MED REC REQUIRED  Post Medication Reconciliation Status: medication reconcilation previously completed during another office visit    Current Outpatient Medications   Medication Sig Dispense Refill     acetaminophen (TYLENOL) 500 MG tablet Take 2 tablets (1,000 mg) by mouth 4 times daily 120 tablet 0     aspirin (ASA) 81 MG chewable tablet Take 1 tablet (81 mg) by mouth daily 30 tablet 0     carvedilol (COREG) 25 MG tablet Take 1 tablet (25 mg) by mouth 2 times daily (with meals) 60 tablet 0     colchicine (COLCRYS) 0.6 MG tablet Take 1 tablet (0.6 mg) by mouth 2 times daily 60 tablet 0     DULoxetine (CYMBALTA) 60 MG capsule Take 1 capsule (60 mg) by mouth daily 30 capsule 0     furosemide (LASIX) 40 MG tablet Take 1 tablet (40 mg) by mouth daily 30 tablet 0     guaiFENesin (MUCINEX) 600 MG 12 hr tablet Take 2 tablets (1,200 mg) by mouth 2 times daily 120 tablet 0     Lidocaine (LIDOCARE) 4 % Patch Place 1 patch onto the skin every 24 hours To prevent lidocaine toxicity, patient should be patch free for 12 hrs daily. 30 patch 0     loratadine (CLARITIN) 10 MG tablet Take 1 tablet (10 mg) by mouth daily 30 tablet 0     meclizine (ANTIVERT) 12.5 MG tablet Take 1 tablet (12.5 mg) by mouth every morning And 12.5mg Po BID PRN 45 tablet 0     melatonin 3 MG tablet Take 1 tablet (3 mg) by mouth nightly as needed for sleep 30 tablet 0     ondansetron (ZOFRAN ODT) 8 MG ODT tab Take 1 tablet (8 mg) by mouth every 8 hours as needed for nausea 30 tablet 0     pantoprazole (PROTONIX) 40 MG EC tablet Take 1 tablet (40 mg) by mouth 2 times daily 60 tablet 0     predniSONE (DELTASONE) 5 MG tablet Take 1 tablet (5 mg) by mouth daily 30 tablet 0     senna-docusate (SENOKOT-S/PERICOLACE) 8.6-50 MG tablet Take 2 tablets by mouth 2 times daily 120 tablet 0     albuterol (PROAIR HFA/PROVENTIL HFA/VENTOLIN HFA) 108 (90 Base) MCG/ACT inhaler Inhale 2 puffs into the lungs every 4 hours as needed for  shortness of breath, wheezing or cough       albuterol (PROVENTIL) (2.5 MG/3ML) 0.083% neb solution Take 2.5 mg by nebulization 4 times daily as needed for shortness of breath, wheezing or cough       alteplase (CATHFLO ACTIVASE) 2 MG injection Inject 2 mg into the vein as needed       atorvastatin (LIPITOR) 40 MG tablet Take 40 mg by mouth daily       Calcium Carbonate Antacid 1177 MG CHEW Take 1 tablet by mouth 3 times daily       cefTRIAXone (ROCEPHIN) 2 GM vial Inject 2 g into the vein every 12 hours For 28 days then discontinue       cyanocobalamin (VITAMIN B-12) 1000 MCG sublingual tablet Place 1 tablet under the tongue daily       fluticasone (FLONASE) 50 MCG/ACT nasal spray Spray 1 spray into both nostrils daily as needed for rhinitis or allergies       Fluticasone-Umeclidin-Vilanterol (TRELEGY ELLIPTA) 200-62.5-25 MCG/ACT oral inhaler Inhale 1 puff into the lungs daily       hydrOXYzine deanna (VISTARIL) 25 MG capsule Take 25 mg by mouth 3 times daily as needed for itching       ipratropium - albuterol 0.5 mg/2.5 mg/3 mL (DUONEB) 0.5-2.5 (3) MG/3ML neb solution Take 1 vial by nebulization 4 times daily       levothyroxine (SYNTHROID/LEVOTHROID) 150 MCG tablet Take 150 mcg by mouth daily       losartan (COZAAR) 50 MG tablet Take 50 mg by mouth daily       magnesium oxide 400 MG tablet Take 400 mg by mouth daily       multivitamin childrens (ANIMAL SHAPES) CHEW chewable tablet Take 2 tablets by mouth daily       nicotine (NICODERM CQ) 7 MG/24HR 24 hr patch Place 1 patch onto the skin daily as needed for nicotine withdrawal symptoms       oxyCODONE (ROXICODONE) 5 MG tablet Take 1-2 tablets (5-10 mg) by mouth every 6 hours as needed for severe pain 30 tablet 0     polyethylene glycol 3350 POWD Take 17 g by mouth daily as needed (constipation)       potassium chloride jaqui ER (KLOR-CON M20) 20 MEQ CR tablet Take 20 mEq by mouth daily       propylene glycol (SYSTANE BALANCE) 0.6 % SOLN ophthalmic solution Place 1  drop into both eyes 4 times daily as needed for dry eyes       simethicone (MYLICON) 125 MG chewable tablet Take 125 mg by mouth every 6 hours as needed for intestinal gas       Vancomycin HCl 1250 MG/250ML SOLN Inject 1,250 mg into the vein daily For 27 days then discontinue       Vitamin D3 (CHOLECALCIFEROL) 25 mcg (1000 units) tablet Take 1 tablet by mouth daily       Controlled medications:   Medication: oxycodone , 23 tabs given to patient at the time of discharge to take home     Past Medical History: No past medical history on file.  Physical Exam:   Vitals: There were no vitals taken for this visit.  BMI: There is no height or weight on file to calculate BMI.  GENERAL APPEARANCE:  Alert, in no distress, cooperative,   RESP:  lungs clear to auscultation , no respiratory distress   CV:  regular rate and rhythm, no murmur, rub, or gallop, no edema  M/S:   no gross joint deformities   NEURO:   Cn 2-12 grossly intact,   PSYCH:  oriented X 3, affect and mood        SNF Labs: Recent labs in [facility] reviewed by me today.  and Most Recent 3 CBC's:  Recent Labs   Lab Test 04/01/24  0700 03/25/24  0746 03/18/24  0700 03/11/24 0700 01/29/19  1153   WBC 7.8 7.3 7.2   < > 11.1*   HGB  --   --   --   --  13.7   MCV  --   --   --   --  96   PLT  --   --   --   --  262    < > = values in this interval not displayed.     Most Recent 3 BMP's:  Recent Labs   Lab Test 04/01/24  0700 03/28/24  0630 03/25/24  0746 03/11/24 0700 01/29/19  1153   NA  --   --   --   --  139   POTASSIUM  --   --   --   --  3.8   CHLORIDE  --   --   --   --  105   CO2  --   --   --   --  29   BUN  --   --   --   --  10   CR 0.89 0.86 0.90   < > 0.69   ANIONGAP  --   --   --   --  5   PRITI  --   --   --   --  9.1   GLC  --   --   --   --  129*    < > = values in this interval not displayed.     Most Recent 2 LFT's:No lab results found.    DISCHARGE PLAN:  Follow up labs: No labs orders/due  Medical Follow Up:   Follow up with primary care provider in 2  weeks  Current [place] scheduled appointments: None.   Discharge Services: Nurse and PCA though Chelsey CLARKE  Discharge Instructions Verbalized to Patient at Discharge:   Weigh yourself daily in the morning and keep a record. Call your primary clinic: a) if you are more short of breath, or b) if your weight changes more than 3 pounds in one day or more than 5 pounds in one week.   Wound care: see discharge orders .   DO NOT DRIVE while taking narcotic pain medications.     TOTAL DISCHARGE TIME: Greater than 30 minutes  Electronically signed by:  TAMMIE Cruz CNP    Documentation of Face to Face and Certification for Home Health Services    I certify that services are/were furnished while this patient was under the care of a physician and that a physician or an allowed non-physician practitioner (NPP), had a face-to-face encounter that meets the physician face-to-face encounter requirements. The encounter was in whole, or in part, related to the primary reason for home health. The patient is confined to his/her home and needs intermittent skilled nursing, physical therapy, speech-language pathology, or the continued need for occupational therapy. A plan of care has been established by a physician and is periodically reviewed by a physician.  Date of Face-to-Face Encounter: 4/4/2024.    I certify that, based on my findings, the following services are medically necessary home health services: Nursing and PCA .    My clinical findings support the need for the above skilled services because: Requires assistance of another person or specialized equipment to access medical services because patient: Is unable to walk greater than 75 feet without rest. and  gets SOB with acitivty..    Patient to re-establish plan of care with their PCP within 7-10 days after leaving the facility to reestablish care.  Medicare certified PECOS provider: TAMMIE Cruz CNP  Date: April 4, 2024    Dr.Yasser Amanda MD, signing  "F2F and only signing for initial order. Please send all follow up questions and concerns or needed follow up signatures to the PCP, who [facility] has on file as:  Bert Mccoy.        MEDICAL NECESSITY STATEMENT FOR DME    Demographic Information on Elly Corrales:    Elly Corrales  Gender: female  : 1955  [address]  [place] [address]  [phone] (home)     Medical Record: [id number]  Social Security Number: [id number]  Primary Care Provider: Bert Mccoy  Insurance: Payor: [facility] / Plan: [facility] DUAL / Product Type: HMO /       Bacterial endocarditis, unspecified chronicity [I33.0]    DME:  VITAL SIGNS:  Vitals: BP (!) 177/82   Pulse 90   Temp 97.8  F (36.6  C)   Resp 18   Ht 1.702 m (5' 7\")   Wt 103 kg (227 lb)   SpO2 93%   BMI 35.55 kg/m    BMI= Body mass index is 35.55 kg/m .     MEDICAL NECESSITY STATEMENT (describe reason to support DME here including length of need)  Bacterial endocarditis, unspecified chronicity [I33.0]    Due to COPD, low back back with sciatica patient has the following limitations of mobility: inability to participate in MRADL's, cannot walk distance with assist device such as cane or walker. She needs electric heavy duty scooter to improve mobility and quality of life. Due to COPD/SOB she fatigues easily and is unable to self propel in manual wheelchair for long distances. Given her complicated medical history she needs to frequently physically attend appointments . She will use this scooter lifetime need and it will improve participation in MRADL's and quality of life.       ELECTRONICALLY SIGNED BY MEÑO CERTIFIED PROVIDER:  TAMMIE Cruz CNP   NPI: [id number]  [facility]  [address] [place] [address]          Sincerely,        TAMMIE Cruz CNP      "

## 2024-04-04 NOTE — H&P (VIEW-ONLY)
[facility]      Preoperative Consultation   Elly Corrales   : 1955   Gender: female    Date of Encounter: 2024    Nursing Notes:   Sara Venegas, LPN  2024 10:56 AM  Sign at exiting of workspace  Chief Complaint   Patient presents with     Preoperative Exam     EGD with possible dilation and clip placement 24        History of Present Illness   Per recent surgical consult note:   67 yo F with PMH of AS, CAD, COPD, HTN, and HLD recently with complex cardiac surgical history with endocarditis. Discharged 3/7/24. Patient had a lap assisted gastrostomy tube on 23. placement during a previous hospitalization. She reports that this was removed in 2023. It has had drainage from the since that time. No evidence of infection at this time. Skin irritation is due to gastric fluid leakage.      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 Her case is complicated given her previous sleeve gastrectomy. Surgery to takedown the fistula would be difficult given comorbidities and risk of further narrowing her stomach as a portion would likely need to be resected. In addition, this fistula is fairly asymptomatic with it only draining a couple cc's/day.      G-tube suites rarely fail to close spontaneously. I would suspect there may be a relative distal obstruction that is contributing to the failure to close. This site was closer to the pylorus than usual so that may contribute to this ongoing drainage as a relative distal obstruction. Recommend UGI study to evaluate gastrocutaneous fistula and distal stricture. Referral to GI for EGD with possible distal dilation and ovesco clip placement on fistula tract if identified.     Patient will follow up in clinic 4 weeks after EGD to re-assess. If fistula persistent at that time would need to have further discussion about risk vs benefits of surgery for gastrocutaneous fistula takedown.     She  is here for preop for the EGD, possible dilation, possible clip placement.       Review of Systems   She remains on oxygen since aortic valve replacement with complications.  She completed IV home antibiotics for infection.  She in on prednisone for COPD.  A comprehensive review of systems was negative except for items noted in HPI.    Patient Active Problem List   Diagnosis Code     GERD (gastroesophageal reflux disease) K21.9     Hypertension, essential I10     Dyslipidemia E78.5     Hypothyroidism, acquired E03.9     Thyroid nodules, dx 2/2013, improved 11/2014, resolved 10/2019 E04.1     Subclavian steal syndrome, s/p left subclavian stent 4/2013 and IR angioplasty 7/2020 for in-stent stenosis G45.8     Probable Migraine G43.909     Obstructive sleep apnea syndrome, severe. Study 12/2013. On bipap G47.33     Carpal tunnel syndrome of right wrist G56.01     Concussion with brief LOC S06.0X1A     Post concussion syndrome F07.81     Sleep disorder G47.9     Cognitive impairment R41.89     COPD, steroid dependent (HC) J44.9     Tobacco dependence F17.200     Chronic right-sided low back pain M54.50, G89.29     Hot flashes R23.2     Chronic right shoulder pain M25.511, G89.29     Xerosis cutis L85.3     Chronic constipation K59.09     Laryngopharyngeal reflux (LPR) K21.9     Nausea R11.0     Opioid type dependence, continuous use (HC) F11.20     Hidradenitis L73.2     Chronic respiratory failure with hypoxia (HC) J96.11     Depression, recurrent (HC) F33.9     COVID-19 virus infection U07.1     Positive JOHN (antinuclear antibody) R76.8     S/P laparoscopic sleeve gastrectomy Z98.84     Ulnar neuropathy at elbow, right G56.21     Acute exacerbation of chronic obstructive pulmonary disease (COPD) (HC) J44.1     Flank pain R10.9     Hematuria R31.9     Obesity, Class III, BMI 40-49.9 (morbid obesity) (HC) E66.01     Ovarian mass N83.8     Pyelonephritis N12     Chronic right-sided thoracic back pain M54.6, G89.29      Pneumonia J18.9     Aortic stenosis I35.0     Concussion with loss of consciousness, sequela (HC) S06.0X9S     Senile nuclear sclerosis, right H25.11     Senile nuclear sclerosis, left H25.12     S/P AVR (aortic valve replacement) Z95.2     At risk for infection Z91.89     Sternal wound dehiscence T81.32XA     Sternal wound infection S21.101A, L08.9     Leg wound, right, subsequent encounter S81.801D     Vertigo R42     Osteopenia of multiple sites M85.89     Current chronic use of systemic steroids Z79.52     Severe sepsis (HC) A41.9, R65.20     Gram-positive cocci bacteremia R78.81     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 Max acetaminophen dose: 4000mg in 24 hrs.     albuterol 0.083% (2.5 mg/3 mL) neb solution Inhale 3 mL (2.5 mg) via a nebulizer 4 times daily.     albuterol HFA (PRO-AIR; VENTOLIN; PROVENTIL) 90 mcg/actuation inhaler Inhale 2 Puffs by mouth every 4 hours if needed for Shortness of Breath 2nd choice. Inhale ONE Puff by mouth every 4 hours if needed for Shortness of Breath 2nd choice or Wheezing 2nd choice.  Strength: 90 mcg/actuation     albuterol-ipratropium (DUONEB) (2.5-0.5 mg) in 3 mL NEBULIZATION solution Inhale 3 mL via a nebulizer 4 times daily if needed for Shortness of Breath 1st choice or Wheezing 1st choice.     aspirin chewable 81 mg chewable tablet Chew 1 Tablet (81 mg) by mouth once daily with a meal.     atorvastatin (LIPITOR) 40 mg tablet Take 1 Tablet (40 mg) by mouth once daily with evening meal.      "calcium alginate-honey (MediHoney, troy alginate-honey,) 4 X 5 \" bndg Apply topically to affected area(s) once daily.     Calcium Carbonate (Tums Ultra) 470 mg calcium (1,177 mg) chew Chew 1 Tablet by mouth three times daily.     carvediloL (COREG) 25 mg tablet Take 1 Tablet (25 mg) by mouth two times daily with meals.     cholecalciferol (VITAMIN D3) 1,000 unit tablet Take 1 Tablet (1,000 units) by mouth once daily.     colchicine 0.6 mg tablet Take 1 Tablet (0.6 mg) by mouth two times daily. For three months     cyanocobalamin (VITAMIN B12) 1,000 mcg sublingual tablet Place 1 Tablet (1,000 mcg) under the tongue once daily.     droNABinol (MARINOL) 5 mg capsule Take 1 Capsule (5 mg) by mouth two times daily before meals.     DULoxetine (CYMBALTA) 60 mg Delayed-release capsule Take 1 Capsule (60 mg) by mouth once daily.     fluticasone (50 mcg per actuation) nasal solution (FLONASE) Inhale 1 Spray into affected nostril(s) once daily if needed for Rhinitis.     fluticasone fur-umeclidinium-vilanterol (Trelegy Ellipta) 200-62.5-25 mcg inhaler Inhale 1 Puff by mouth once daily. Take in place of Wixela + Spiriva.     furosemide (LASIX) 40 mg tablet Take 1 Tablet (40 mg) by mouth every morning.     guaiFENesin (MUCINEX) 600 mg Extended-Release tablet Take 1 Tablet (600 mg) by mouth 2 times daily if needed for Expectoration.     hydrOXYzine pamoate (VISTARIL) 25 mg capsule Take 1 Capsule (25 mg) by mouth 3 times daily if needed for Anxiety.     levothyroxine (SYNTHROID) 150 mcg tablet Take 1 Tablet (150 mcg) by mouth once daily.     lidocaine 5 % topical patch Apply 1 Patch on dry, clean, hairless skin once daily. Max 12 hours per 24 hour period     loratadine (CLARITIN) 10 mg tablet Take 1 Tablet (10 mg) by mouth once daily.     losartan (COZAAR) 50 mg tablet Take 1 Tablet (50 mg) by mouth once daily.     magnesium oxide (MAG-) 400 mg tablet Take 1 Tablet (400 mg) by mouth once daily.     meclizine (ANTIVERT) 12.5 " mg tablet Take 1 Tablet (12.5 mg) by mouth 3 times daily if needed for Vertigo. (Patient taking differently: Take 12.5 mg by mouth three times daily.)     melatonin 3 mg tablet Take 1 Tablet (3 mg) by mouth at bedtime if needed for Sleep.     multivitamin pediatric chewable tablet Chew 2 Tablets by mouth once daily.     ondansetron (ZOFRAN ODT) 8 mg disintegrating tablet Place 1 Tablet (8 mg) on the tongue every 8 hours if needed for Nausea/Vomiting.     oxyCODONE (ROXICODONE) 5 mg immediate release tablet Take 1 Tablet (5 mg) by mouth every 6 hours if needed for Pain.     pantoprazole (PROTONIX) 40 mg delayed-release tablet Take 1 Tablet (40 mg) by mouth two times daily before meals.     polyethylene glycoL (MIRALAX) 17 gram/scoop powder Mix 1 scoop (17 g) in liquid then take by mouth once daily if needed for Constipation.     potassium chloride (KLOR-CON M20) 20 mEq extended-release tablet (part/cryst) Take 1 Tablet (20 mEq) by mouth once daily with a meal.     predniSONE (DELTASONE) 5 mg tablet Take 1 Tablet (5 mg) by mouth once daily with a meal.     propylene glycoL (Systane Complete) 0.6 % ophthalmic solution Place 1 Drop into the eye(s) four times daily.     sennosides-docusate (SENOKOT S) (8.6-50 mg) tablet Take 2 Tablets by mouth once daily. Please take daily while still using oxycodone for post-op pain relief to prevent constipation.     Simethicone 125 mg chewable tablet Chew 1 Tablet (125 mg) by mouth every 6 hours if needed for Flatulence. Max dose: 500 mg per 24 hrs     No current facility-administered medications for this visit.     Medications have been reviewed by me and are current to the best of my knowledge and ability.     Allergies   Allergen Reactions     Cephalexin Shortness Of Breath, Rash and Chest Pain     Gabapentin Dizziness and Sedation     Even at 100mg tid     Pcn [Penicillins] Rash     ##No similarities in side chains, very low to no risk of cross-sensitivity to ANCEF. ANW  Antimicrobial Stewardship Team 10/2019  Childhood reaction        Past Surgical History:   . Laterality Date     AORTIC VALVE REPLACEMENT  2023    Aortic valve replacement with bioprosthetic Inspiris Aortic valve size 25, ref:[id number], SN:[id number], implanted by Dr Shea in the aortic valve on 2023     BUNIONECTOMY  2002    Bilateral     CATARACT REMOVAL Right 2023    IOL     ESOPHAGOGASTRODUODENOSCOPY  2/15/06,12    Normal, routine bx's negative.     GASTROSTOMY TUBE  2023    LAPAROSCOPIC GASTROSTOMY TUBE PLACEMENT - Dr. Jadon PEREZ TOENAIL REMOVAL       ir left subclavain arteriogram  2013    Dr. Levin     IR Left Subclavian Arteriogram Left 2020    angioplasty - Dr Levin     LAPROSCOPIC GASTRECTOMY SLEEVE  2022    Dr. Hale     RIGHT OOPHORECTOMY Right      TONSILLECTOMY       URETHRA DILATION       Social History     Tobacco Use     Smoking status: Former     Current packs/day: 0.00     Average packs/day: 1 pack/day for 54.7 years (54.7 ttl pk-yrs)     Types: Cigarettes     Start date: 1969     Quit date: 2023     Years since quittin.5     Smokeless tobacco: Never     Tobacco comments:     4 cigs per day as of 2023   Vaping Use     Vaping status: Never Used   Substance Use Topics     Alcohol use: Not Currently     Alcohol/week: 0.0 standard drinks of alcohol     Comment: very rare     Drug use: Never     Family History   Problem Relation Age of Onset     Cancer-breast Mother 45     Liver cancer Mother          at 72 stage 4 liver cancer     Kidney cancer Mother      Depression Mother      Other Father         accident, 74     Alcohol/Drug Sister          of unintentional drug overdose     Heart failure Sister      Drug Abuse Sister      Depression Sister      Anxiety disorder Sister      Alcohol/Drug Brother      COPD Brother          age 58     Good Health Brother      Cancer Maternal Grandmother       Kidney cancer Paternal Grandfather      PAST DIFFICULTY WITH ANESTHESIA: None     Physical Exam   /62 (Cuff Site: Right Forearm, Position: Sitting, Cuff Size: Adult Regular)   Pulse 84   Temp 98.5  F (36.9  C) (Tympanic)   Resp 20   Wt 101.2 kg (223 lb)   SpO2 93% Comment: 2L NC  BMI 35.99 kg/m   Body mass index is 35.99 kg/m .    GENERAL:sitting in wheelchair, on oxygen, NAD  EYES: No icterus or injection  HEAD, EARS, NOSE, MOUTH, AND THROAT: No oral lesions, mucous membranes moist.  External canals and tympanic membranes normal  NECK: Supple, no lymphadenopathy, no thyromegaly.   RESPIRATORY: Clear to auscultation bilaterally   CARDIOVASCULAR:  Regular rate and rhythm, normal S1/S2, no murmur.  No lower extremity edema.  ABDOMEN: +BS, soft, nontender.  Urostomy bag over gastrocutaneous fistula.  NEUROLOGIC: Alert and oriented to person/place/time/circumstances. No tremor.   PSYCHIATRIC: Normal speech and affect      Assessment / Plan   Labs:   Results for orders placed or performed in visit on 04/04/24   CBC WITH AUTO DIFFERENTIAL   Result Value Ref Range    WHITE BLOOD COUNT         10.9 4.5 - 11.0 thou/cu mm    RED BLOOD COUNT           3.71 (L) 4.00 - 5.20 mil/cu mm    HEMOGLOBIN                11.0 (L) 12.0 - 16.0 g/dL    HEMATOCRIT                35.4 33.0 - 51.0 %    MCV                       95 80 - 100 fL    MCH                       29.6 26.0 - 34.0 pg    MCHC                      31.1 (L) 32.0 - 36.0 g/dL    RDW                       17.1 (H) 11.5 - 15.5 %    PLATELET COUNT            421 140 - 440 thou/cu mm    MPV                       11.0 6.5 - 11.0 fL    NRBC                      0.0 %    ABS NRBC 0.0 thou /cu mm    % NEUT 81.2 %    % LYMPH 9.9 %    % MONO 6.5 %    % EOS 0.9 %    % BASO 1.1 %    % IMMATURE GRAN (METAS,MYELOS,PROS) 0.4 %    ABSOLUTE NEUTROPHILS      8.8 (H) 1.7 - 7.0 thou/cu mm    ABSOLUTE LYMPHOCYTES      1.1 0.9 - 2.9 thou/cu mm    ABSOLUTE MONOCYTES        0.7 <0.9  thou/cu mm    ABSOLUTE EOSINOPHILS      0.1 <0.5 thou/cu mm    ABSOLUTE BASOPHILS        0.1 <0.3 thou/cu mm    ABSOLUTE IMMATURE GRANULOCYTES(METAS,MYELOS,PROS) 0.0 <0.3 thou/cu mm     *Note: Due to a large number of results and/or encounters for the requested time period, some results have not been displayed. A complete set of results can be found in Results Review.      ECG: NSR, stable poor R wave progression, nonspecific T wave flattening in inferior and anterior leads.        ICD-10-CM    1. Preop general physical exam  Z01.818 HEMOGLOBIN     PA READING EKG - NO CHARGE, COMP ONLY     EKG 12 LEAD     PA ECG ROUTINE ECG W/LEAST 12 LDS W/I&R     POTASSIUM     CANCELED: POTASSIUM      2. Gastrocutaneous fistula  K31.6       3. Current chronic use of systemic steroids  Z79.52       4. S/P AVR (aortic valve replacement)  Z95.2 oxyCODONE (ROXICODONE) 5 mg immediate release tablet      5. Postoperative pain  G89.18 oxyCODONE (ROXICODONE) 5 mg immediate release tablet        Patient is cleared for planned procedure pending potassium level (ordered STAT since procedure is tomorrow but result is pending at this time).  No significant change on EKG.    She only takes prednisone 5mg daily.  Take this in the AM before EGD.  Stress dose steroids only if hypotensive.      Patient Instructions     In the morning, take carvedilol, prednisone, duloxetine, and oxycodone with a sip of water.      Follow up within 30 days.     Today you were prescribed oxycodone, which is a narcotic pain medication.  Narcotics are sedating and can be habit-forming.  They should not be taken with other narcotics, alcohol, or other mood/mind altering medications.  You should not drive or operate heavy machinery while taking narcotics.  Lost or stolen medications will not be replaced.  You agree not to share your medication with anyone else. They should be used at the lowest dose and shortest duration possible.  They can cause drowsiness, dizziness,  nausea, and constipation.  If constipation develops, take over the counter laxatives, such as Miralax or senna.  Serious reactions include impairment in breathing, especially in combination with other medications, alcohol, or drugs.           Electronically Signed by:   Bert Mccoy MD   4/4/2024    Addendum: potassium returned low at 3.2.  She is taking Kcl 20mEq daily.  She will take an extra 40mEq this afternoon and another 40mEq in the morning.  Recommend recheck K in preop area tomorrow.     After the procedure, she will increase maintenance dose to 20mEq TWICE daily.    Bert Mccoy MD...........4/4/2024  2:10 PM

## 2024-04-05 ENCOUNTER — ANESTHESIA (OUTPATIENT)
Dept: SURGERY | Facility: CLINIC | Age: 69
End: 2024-04-05
Payer: COMMERCIAL

## 2024-04-05 ENCOUNTER — HOSPITAL ENCOUNTER (OUTPATIENT)
Facility: CLINIC | Age: 69
Discharge: HOME | End: 2024-04-05
Attending: INTERNAL MEDICINE | Admitting: INTERNAL MEDICINE
Payer: COMMERCIAL

## 2024-04-05 ENCOUNTER — ANESTHESIA EVENT (OUTPATIENT)
Dept: SURGERY | Facility: CLINIC | Age: 69
End: 2024-04-05
Payer: COMMERCIAL

## 2024-04-05 VITALS
OXYGEN SATURATION: 96 % | DIASTOLIC BLOOD PRESSURE: 70 MMHG | SYSTOLIC BLOOD PRESSURE: 142 MMHG | HEIGHT: 66 IN | RESPIRATION RATE: 165 BRPM | TEMPERATURE: 99 F | WEIGHT: 227 LBS | BODY MASS INDEX: 36.48 KG/M2 | HEART RATE: 85 BPM

## 2024-04-05 LAB — UPPER GI ENDOSCOPY: NORMAL

## 2024-04-05 PROCEDURE — 710N000012 HC RECOVERY PHASE 2, PER MINUTE: Performed by: INTERNAL MEDICINE

## 2024-04-05 PROCEDURE — 250N000009 HC RX 250: Performed by: NURSE ANESTHETIST, CERTIFIED REGISTERED

## 2024-04-05 PROCEDURE — 272N000002 HC OR SUPPLY OTHER OPNP: Performed by: INTERNAL MEDICINE

## 2024-04-05 PROCEDURE — 272N000001 HC OR GENERAL SUPPLY STERILE: Performed by: INTERNAL MEDICINE

## 2024-04-05 PROCEDURE — 370N000017 HC ANESTHESIA TECHNICAL FEE, PER MIN: Performed by: INTERNAL MEDICINE

## 2024-04-05 PROCEDURE — 999N000141 HC STATISTIC PRE-PROCEDURE NURSING ASSESSMENT: Performed by: INTERNAL MEDICINE

## 2024-04-05 PROCEDURE — 360N000075 HC SURGERY LEVEL 2, PER MIN: Performed by: INTERNAL MEDICINE

## 2024-04-05 PROCEDURE — 258N000003 HC RX IP 258 OP 636: Performed by: ANESTHESIOLOGY

## 2024-04-05 PROCEDURE — 250N000011 HC RX IP 250 OP 636: Performed by: NURSE ANESTHETIST, CERTIFIED REGISTERED

## 2024-04-05 RX ORDER — FENTANYL CITRATE 50 UG/ML
25 INJECTION, SOLUTION INTRAMUSCULAR; INTRAVENOUS
Status: DISCONTINUED | OUTPATIENT
Start: 2024-04-05 | End: 2024-04-05 | Stop reason: HOSPADM

## 2024-04-05 RX ORDER — LIDOCAINE HYDROCHLORIDE 10 MG/ML
INJECTION, SOLUTION INFILTRATION; PERINEURAL PRN
Status: DISCONTINUED | OUTPATIENT
Start: 2024-04-05 | End: 2024-04-05

## 2024-04-05 RX ORDER — SODIUM CHLORIDE, SODIUM LACTATE, POTASSIUM CHLORIDE, CALCIUM CHLORIDE 600; 310; 30; 20 MG/100ML; MG/100ML; MG/100ML; MG/100ML
INJECTION, SOLUTION INTRAVENOUS CONTINUOUS
Status: DISCONTINUED | OUTPATIENT
Start: 2024-04-05 | End: 2024-04-05 | Stop reason: HOSPADM

## 2024-04-05 RX ORDER — LIDOCAINE 40 MG/G
CREAM TOPICAL
Status: DISCONTINUED | OUTPATIENT
Start: 2024-04-05 | End: 2024-04-05 | Stop reason: HOSPADM

## 2024-04-05 RX ORDER — HYDROMORPHONE HCL IN WATER/PF 6 MG/30 ML
0.2 PATIENT CONTROLLED ANALGESIA SYRINGE INTRAVENOUS EVERY 5 MIN PRN
Status: DISCONTINUED | OUTPATIENT
Start: 2024-04-05 | End: 2024-04-05 | Stop reason: HOSPADM

## 2024-04-05 RX ORDER — NALOXONE HYDROCHLORIDE 0.4 MG/ML
0.1 INJECTION, SOLUTION INTRAMUSCULAR; INTRAVENOUS; SUBCUTANEOUS
Status: DISCONTINUED | OUTPATIENT
Start: 2024-04-05 | End: 2024-04-05 | Stop reason: HOSPADM

## 2024-04-05 RX ORDER — EPINEPHRINE 1 MG/ML
0.1 INJECTION, SOLUTION INTRAMUSCULAR; SUBCUTANEOUS
Status: DISCONTINUED | OUTPATIENT
Start: 2024-04-05 | End: 2024-04-05 | Stop reason: HOSPADM

## 2024-04-05 RX ORDER — ONDANSETRON 4 MG/1
4 TABLET, ORALLY DISINTEGRATING ORAL EVERY 30 MIN PRN
Status: DISCONTINUED | OUTPATIENT
Start: 2024-04-05 | End: 2024-04-05 | Stop reason: HOSPADM

## 2024-04-05 RX ORDER — PROPOFOL 10 MG/ML
INJECTION, EMULSION INTRAVENOUS PRN
Status: DISCONTINUED | OUTPATIENT
Start: 2024-04-05 | End: 2024-04-05

## 2024-04-05 RX ORDER — OXYCODONE HYDROCHLORIDE 5 MG/1
10 TABLET ORAL
Status: DISCONTINUED | OUTPATIENT
Start: 2024-04-05 | End: 2024-04-05 | Stop reason: HOSPADM

## 2024-04-05 RX ORDER — OXYCODONE HYDROCHLORIDE 5 MG/1
5 TABLET ORAL
Status: DISCONTINUED | OUTPATIENT
Start: 2024-04-05 | End: 2024-04-05 | Stop reason: HOSPADM

## 2024-04-05 RX ORDER — ONDANSETRON 2 MG/ML
4 INJECTION INTRAMUSCULAR; INTRAVENOUS EVERY 30 MIN PRN
Status: DISCONTINUED | OUTPATIENT
Start: 2024-04-05 | End: 2024-04-05 | Stop reason: HOSPADM

## 2024-04-05 RX ORDER — FLUMAZENIL 0.1 MG/ML
0.2 INJECTION, SOLUTION INTRAVENOUS
Status: DISCONTINUED | OUTPATIENT
Start: 2024-04-05 | End: 2024-04-05 | Stop reason: HOSPADM

## 2024-04-05 RX ORDER — PROCHLORPERAZINE MALEATE 5 MG
5 TABLET ORAL EVERY 6 HOURS PRN
Status: DISCONTINUED | OUTPATIENT
Start: 2024-04-05 | End: 2024-04-05 | Stop reason: HOSPADM

## 2024-04-05 RX ORDER — NALOXONE HYDROCHLORIDE 0.4 MG/ML
0.4 INJECTION, SOLUTION INTRAMUSCULAR; INTRAVENOUS; SUBCUTANEOUS
Status: DISCONTINUED | OUTPATIENT
Start: 2024-04-05 | End: 2024-04-05 | Stop reason: HOSPADM

## 2024-04-05 RX ORDER — ATROPINE SULFATE 0.1 MG/ML
1 INJECTION INTRAVENOUS
Status: DISCONTINUED | OUTPATIENT
Start: 2024-04-05 | End: 2024-04-05 | Stop reason: HOSPADM

## 2024-04-05 RX ORDER — NALOXONE HYDROCHLORIDE 0.4 MG/ML
0.2 INJECTION, SOLUTION INTRAMUSCULAR; INTRAVENOUS; SUBCUTANEOUS
Status: DISCONTINUED | OUTPATIENT
Start: 2024-04-05 | End: 2024-04-05 | Stop reason: HOSPADM

## 2024-04-05 RX ORDER — ALBUTEROL SULFATE 0.83 MG/ML
2.5 SOLUTION RESPIRATORY (INHALATION) EVERY 4 HOURS PRN
Status: DISCONTINUED | OUTPATIENT
Start: 2024-04-05 | End: 2024-04-05 | Stop reason: HOSPADM

## 2024-04-05 RX ORDER — ONDANSETRON 2 MG/ML
4 INJECTION INTRAMUSCULAR; INTRAVENOUS
Status: DISCONTINUED | OUTPATIENT
Start: 2024-04-05 | End: 2024-04-05 | Stop reason: HOSPADM

## 2024-04-05 RX ORDER — SIMETHICONE 40MG/0.6ML
133 SUSPENSION, DROPS(FINAL DOSAGE FORM)(ML) ORAL
Status: DISCONTINUED | OUTPATIENT
Start: 2024-04-05 | End: 2024-04-05 | Stop reason: HOSPADM

## 2024-04-05 RX ORDER — DIPHENHYDRAMINE HYDROCHLORIDE 50 MG/ML
25-50 INJECTION INTRAMUSCULAR; INTRAVENOUS
Status: DISCONTINUED | OUTPATIENT
Start: 2024-04-05 | End: 2024-04-05 | Stop reason: HOSPADM

## 2024-04-05 RX ORDER — FENTANYL CITRATE 50 UG/ML
50-100 INJECTION, SOLUTION INTRAMUSCULAR; INTRAVENOUS EVERY 5 MIN PRN
Status: DISCONTINUED | OUTPATIENT
Start: 2024-04-05 | End: 2024-04-05 | Stop reason: HOSPADM

## 2024-04-05 RX ORDER — LABETALOL HYDROCHLORIDE 5 MG/ML
10 INJECTION, SOLUTION INTRAVENOUS
Status: DISCONTINUED | OUTPATIENT
Start: 2024-04-05 | End: 2024-04-05 | Stop reason: HOSPADM

## 2024-04-05 RX ORDER — HYDROMORPHONE HCL IN WATER/PF 6 MG/30 ML
0.4 PATIENT CONTROLLED ANALGESIA SYRINGE INTRAVENOUS EVERY 5 MIN PRN
Status: DISCONTINUED | OUTPATIENT
Start: 2024-04-05 | End: 2024-04-05 | Stop reason: HOSPADM

## 2024-04-05 RX ORDER — ONDANSETRON 4 MG/1
4 TABLET, ORALLY DISINTEGRATING ORAL EVERY 6 HOURS PRN
Status: DISCONTINUED | OUTPATIENT
Start: 2024-04-05 | End: 2024-04-05 | Stop reason: HOSPADM

## 2024-04-05 RX ORDER — MEPERIDINE HYDROCHLORIDE 25 MG/ML
12.5 INJECTION INTRAMUSCULAR; INTRAVENOUS; SUBCUTANEOUS EVERY 5 MIN PRN
Status: DISCONTINUED | OUTPATIENT
Start: 2024-04-05 | End: 2024-04-05 | Stop reason: HOSPADM

## 2024-04-05 RX ORDER — ONDANSETRON 2 MG/ML
4 INJECTION INTRAMUSCULAR; INTRAVENOUS EVERY 6 HOURS PRN
Status: DISCONTINUED | OUTPATIENT
Start: 2024-04-05 | End: 2024-04-05 | Stop reason: HOSPADM

## 2024-04-05 RX ORDER — FENTANYL CITRATE 50 UG/ML
25 INJECTION, SOLUTION INTRAMUSCULAR; INTRAVENOUS EVERY 5 MIN PRN
Status: DISCONTINUED | OUTPATIENT
Start: 2024-04-05 | End: 2024-04-05 | Stop reason: HOSPADM

## 2024-04-05 RX ORDER — HALOPERIDOL 5 MG/ML
1 INJECTION INTRAMUSCULAR
Status: DISCONTINUED | OUTPATIENT
Start: 2024-04-05 | End: 2024-04-05 | Stop reason: HOSPADM

## 2024-04-05 RX ORDER — FENTANYL CITRATE 50 UG/ML
50 INJECTION, SOLUTION INTRAMUSCULAR; INTRAVENOUS EVERY 5 MIN PRN
Status: DISCONTINUED | OUTPATIENT
Start: 2024-04-05 | End: 2024-04-05 | Stop reason: HOSPADM

## 2024-04-05 RX ADMIN — LIDOCAINE HYDROCHLORIDE 10 ML: 10 INJECTION, SOLUTION INFILTRATION; PERINEURAL at 10:10

## 2024-04-05 RX ADMIN — PROPOFOL 50 MG: 10 INJECTION, EMULSION INTRAVENOUS at 10:14

## 2024-04-05 RX ADMIN — PROPOFOL 50 MG: 10 INJECTION, EMULSION INTRAVENOUS at 10:17

## 2024-04-05 RX ADMIN — PROPOFOL 60 MG: 10 INJECTION, EMULSION INTRAVENOUS at 10:10

## 2024-04-05 RX ADMIN — PROPOFOL 40 MG: 10 INJECTION, EMULSION INTRAVENOUS at 10:12

## 2024-04-05 RX ADMIN — SODIUM CHLORIDE, POTASSIUM CHLORIDE, SODIUM LACTATE AND CALCIUM CHLORIDE: 600; 310; 30; 20 INJECTION, SOLUTION INTRAVENOUS at 09:26

## 2024-04-05 ASSESSMENT — ACTIVITIES OF DAILY LIVING (ADL)
ADLS_ACUITY_SCORE: 35
ADLS_ACUITY_SCORE: 35
ADLS_ACUITY_SCORE: 33

## 2024-04-05 ASSESSMENT — COPD QUESTIONNAIRES: COPD: 1

## 2024-04-05 NOTE — ANESTHESIA PREPROCEDURE EVALUATION
Anesthesia Pre-Procedure Evaluation    Patient: Elly Corrales   MRN: [id number] : 1955        Procedure : Procedure(s):  ESOPHAGOGASTRODUODENOSCOPY          Past Medical History:   Diagnosis Date    Sleep apnea       Past Surgical History:   Procedure Laterality Date    PARTIAL HYSTERECTOMY        Allergies   Allergen Reactions    Cephalexin Shortness Of Breath and Rash     And chest pain    Gabapentin Dizziness     Increased sedation even at 100 mg TID    Penicillin G Hives and Rash    Penicillins Hives and Rash      Social History     Tobacco Use    Smoking status: Former    Smokeless tobacco: Not on file   Substance Use Topics    Alcohol use: No      Wt Readings from Last 1 Encounters:   24 103 kg (227 lb)        Anesthesia Evaluation            ROS/MED HX  ENT/Pulmonary:     (+) sleep apnea,                         COPD,              Neurologic:       Cardiovascular:     (+)  hypertension- -   -  - -                           valvular problems/murmurs type: AS s/p AVR and MV repair.    Previous cardiac testing   Echo: Date: 3/2024 Results:  Final Impressions:   Limited Echocardiogram performed    1. Status-post cryolife AV conduit and MV repair with vegetation debridement and commissural stitch.    2. Normal LV size, moderately increased wall thickness, hyperdynamic global systolic function with an estimated EF of > 75%. Mid-cavitary gradient to 42 mmHg.    3. No aortic regurgitation. No assessment for stenosis and limited views.    4. No mitral regurgitation. Diastolic gradient 4 mmHg at 97 bpm.    5. Echodensity noted adjacent to the RV free wall in the pericardial space, may reflect thrombus. Septal shift suggested in some 2-beat views that could represent ventricular interdependence, but no formal assessment of diastology or respirometry of ventricular inflows to further assess.    6. Right ventricular cavity size is normal, global systolic RV function is mildly reduced.    7. Echo contrast  "was administered to enhance visualization of all left ventricular segments.     Stress Test:  Date: Results:    ECG Reviewed:  Date: Results:    Cath:  Date: Results:      METS/Exercise Tolerance:     Hematologic:       Musculoskeletal:       GI/Hepatic:     (+) GERD,                   Renal/Genitourinary:       Endo:     (+)          thyroid problem,     Obesity,       Psychiatric/Substance Use:       Infectious Disease:       Malignancy:       Other:            Physical Exam    Airway        Mallampati: II   TM distance: > 3 FB   Neck ROM: full   Mouth opening: > 3 cm    Respiratory Devices and Support         Dental       (+) Minor Abnormalities - some fillings, tiny chips      Cardiovascular   cardiovascular exam normal          Pulmonary   pulmonary exam normal                OUTSIDE LABS:  CBC:   Lab Results   Component Value Date    WBC 7.8 04/01/2024    WBC 7.3 03/25/2024    HGB 13.7 01/29/2019    HCT 42.6 01/29/2019     01/29/2019     BMP:   Lab Results   Component Value Date     01/29/2019    POTASSIUM 3.8 01/29/2019    CHLORIDE 105 01/29/2019    CO2 29 01/29/2019    BUN 10 01/29/2019    CR 0.89 04/01/2024    CR 0.86 03/28/2024     (H) 01/29/2019     COAGS: No results found for: \"PTT\", \"INR\", \"FIBR\"  POC: No results found for: \"BGM\", \"HCG\", \"HCGS\"  HEPATIC: No results found for: \"ALBUMIN\", \"PROTTOTAL\", \"ALT\", \"AST\", \"GGT\", \"ALKPHOS\", \"BILITOTAL\", \"BILIDIRECT\", \"LUCIANA\"  OTHER:   Lab Results   Component Value Date    PRITI 9.1 01/29/2019       Anesthesia Plan    ASA Status:  3    NPO Status:  NPO Appropriate    Anesthesia Type: MAC.     - Reason for MAC: straight local not clinically adequate              Consents            Postoperative Care            Comments:    Other Comments: Discussed risks and benefits of MAC including remembering portions of the case and possible need to convert to general anesthesia if intolerant of procedure or respiratory compromise.              Justin JEROME " "MD Lucero    I have reviewed the pertinent notes and labs in the chart from the past 30 days and (re)examined the patient.  Any updates or changes from those notes are reflected in this note.             # Drug Induced Platelet Defect: home medication list includes an antiplatelet medication  # Obesity: Estimated body mass index is 36.64 kg/m  as calculated from the following:    Height as of this encounter: 1.676 m (5' 6\").    Weight as of this encounter: 103 kg (227 lb).      "

## 2024-04-05 NOTE — ANESTHESIA CARE TRANSFER NOTE
Patient: Elly Corrales    Procedure: Procedure(s):  ESOPHAGOGASTRODUODENOSCOPY with Ovesco clip placement       Diagnosis: Gastrocutaneous fistula due to gastrostomy tube [K31.6]  Diagnosis Additional Information: No value filed.    Anesthesia Type:   MAC     Note:    Oropharynx: oropharynx clear of all foreign objects  Level of Consciousness: awake  Oxygen Supplementation: face mask  Level of Supplemental Oxygen (L/min / FiO2): 8  Independent Airway: airway patency not satisfactory and stable  Dentition: dentition unchanged  Vital Signs Stable: post-procedure vital signs reviewed and stable  Report to RN Given: handoff report given  Patient transferred to: Phase II    Handoff Report: Identifed the Patient, Identified the Reponsible Provider, Reviewed the pertinent medical history, Discussed the surgical course, Reviewed Intra-OP anesthesia mangement and issues during anesthesia, Set expectations for post-procedure period and Allowed opportunity for questions and acknowledgement of understanding      Vitals:  Vitals Value Taken Time   /56 04/05/24 1026   Temp 37.2  C (99  F) 04/05/24 1025   Pulse 84 04/05/24 1029   Resp 16 04/05/24 1025   SpO2 99 % 04/05/24 1029   Vitals shown include unfiled device data.    Electronically Signed By: TAMMIE BANDA CRNA  April 5, 2024  10:30 AM

## 2024-04-05 NOTE — INTERVAL H&P NOTE
"I have reviewed the surgical (or preoperative) H&P that is linked to this encounter, and examined the patient. There are no significant changes    Clinical Conditions Present on Arrival:  Clinically Significant Risk Factors Present on Admission                 # Drug Induced Platelet Defect: home medication list includes an antiplatelet medication  # Obesity: Estimated body mass index is 36.64 kg/m  as calculated from the following:    Height as of this encounter: 1.676 m (5' 6\").    Weight as of this encounter: 103 kg (227 lb).       "

## 2024-04-05 NOTE — ANESTHESIA POSTPROCEDURE EVALUATION
Patient: Elly Corrales    Procedure: Procedure(s):  ESOPHAGOGASTRODUODENOSCOPY with Ovesco clip placement       Anesthesia Type:  MAC    Note:  Disposition: Outpatient   Postop Pain Control: Uneventful            Sign Out: Well controlled pain   PONV: No   Neuro/Psych: Uneventful            Sign Out: Acceptable/Baseline neuro status   Airway/Respiratory: Uneventful            Sign Out: Acceptable/Baseline resp. status   CV/Hemodynamics: Uneventful            Sign Out: Acceptable CV status   Other NRE:    DID A NON-ROUTINE EVENT OCCUR?            Last vitals:  Vitals Value Taken Time   /70 04/05/24 1045   Temp 37.2  C (99  F) 04/05/24 1025   Pulse 85 04/05/24 1045   Resp 165 04/05/24 1045   SpO2 94 % 04/05/24 1046   Vitals shown include unfiled device data.    Electronically Signed By: Justin Barker MD  April 5, 2024  12:34 PM

## 2024-04-05 NOTE — PROGRESS NOTES
"This patient had Care Everywhere (fancy e) generated infection flag for VRE, but manual chart review, by IP, of Care everywhere lab results back to 10/19/2023 revealed the 10/19/23 sputum culture was NOT VRE. Further, other enterococcus faecium organisms in other cultures of care everywhere lab results were NOT VRE. Therefore the VRE infection flag and \"VRE Clearance\" (both [facility] infection flags were resolved to reduce confusion amongst care teams this hospital encounter.     .Nicole Yun, Infection Prevention  .4/5/2024  .9:40 AM    "

## (undated) DEVICE — TUBING SUCTION MEDI-VAC 1/4"X20' N620A

## (undated) DEVICE — Device

## (undated) DEVICE — SUCTION MANIFOLD NEPTUNE 2 SYS 1 PORT 702-025-000

## (undated) DEVICE — SOL WATER IRRIG 1000ML BOTTLE 2F7114